# Patient Record
Sex: MALE | Race: WHITE | NOT HISPANIC OR LATINO | Employment: FULL TIME | URBAN - METROPOLITAN AREA
[De-identification: names, ages, dates, MRNs, and addresses within clinical notes are randomized per-mention and may not be internally consistent; named-entity substitution may affect disease eponyms.]

---

## 2017-02-04 ENCOUNTER — TRANSCRIBE ORDERS (OUTPATIENT)
Dept: ADMINISTRATIVE | Facility: HOSPITAL | Age: 57
End: 2017-02-04

## 2017-02-04 ENCOUNTER — HOSPITAL ENCOUNTER (OUTPATIENT)
Dept: RADIOLOGY | Facility: HOSPITAL | Age: 57
Discharge: HOME | End: 2017-02-04
Payer: COMMERCIAL

## 2017-02-04 DIAGNOSIS — R52 PAIN: ICD-10-CM

## 2017-02-04 DIAGNOSIS — R52 PAIN: Primary | ICD-10-CM

## 2017-02-04 PROCEDURE — 73502 X-RAY EXAM HIP UNI 2-3 VIEWS: CPT

## 2017-02-04 PROCEDURE — 72110 X-RAY EXAM L-2 SPINE 4/>VWS: CPT

## 2017-02-11 ENCOUNTER — TRANSCRIBE ORDERS (OUTPATIENT)
Dept: ADMINISTRATIVE | Facility: HOSPITAL | Age: 57
End: 2017-02-11

## 2017-02-11 DIAGNOSIS — M54.16 LUMBAR RADICULOPATHY: Primary | ICD-10-CM

## 2017-02-12 ENCOUNTER — HOSPITAL ENCOUNTER (OUTPATIENT)
Dept: MRI IMAGING | Facility: HOSPITAL | Age: 57
Discharge: HOME | End: 2017-02-12
Payer: COMMERCIAL

## 2017-02-12 DIAGNOSIS — M54.16 LUMBAR RADICULOPATHY: ICD-10-CM

## 2017-02-12 PROCEDURE — 72148 MRI LUMBAR SPINE W/O DYE: CPT

## 2018-06-30 ENCOUNTER — TRANSCRIBE ORDERS (OUTPATIENT)
Dept: ADMINISTRATIVE | Facility: HOSPITAL | Age: 58
End: 2018-06-30

## 2018-06-30 ENCOUNTER — HOSPITAL ENCOUNTER (OUTPATIENT)
Dept: RADIOLOGY | Facility: HOSPITAL | Age: 58
Discharge: HOME | End: 2018-06-30
Payer: COMMERCIAL

## 2018-06-30 DIAGNOSIS — M47.817 SPONDYLOSIS OF LUMBOSACRAL REGION WITHOUT MYELOPATHY OR RADICULOPATHY: ICD-10-CM

## 2018-06-30 DIAGNOSIS — M25.519 SHOULDER PAIN, UNSPECIFIED CHRONICITY, UNSPECIFIED LATERALITY: Primary | ICD-10-CM

## 2018-06-30 DIAGNOSIS — M54.17 RADICULITIS, LUMBOSACRAL: ICD-10-CM

## 2018-06-30 DIAGNOSIS — M25.519 SHOULDER PAIN, UNSPECIFIED CHRONICITY, UNSPECIFIED LATERALITY: ICD-10-CM

## 2018-06-30 DIAGNOSIS — M16.12 PRIMARY OSTEOARTHRITIS OF LEFT HIP: ICD-10-CM

## 2018-06-30 PROCEDURE — 73502 X-RAY EXAM HIP UNI 2-3 VIEWS: CPT

## 2018-06-30 PROCEDURE — 73030 X-RAY EXAM OF SHOULDER: CPT

## 2018-07-16 ENCOUNTER — EVALUATION (OUTPATIENT)
Dept: PHYSICAL THERAPY | Facility: CLINIC | Age: 58
End: 2018-07-16
Payer: COMMERCIAL

## 2018-07-16 DIAGNOSIS — M54.16 LUMBAR RADICULOPATHY: Primary | ICD-10-CM

## 2018-07-16 PROCEDURE — 97112 NEUROMUSCULAR REEDUCATION: CPT | Performed by: PHYSICAL MEDICINE & REHABILITATION

## 2018-07-16 PROCEDURE — G8991 OTHER PT/OT GOAL STATUS: HCPCS | Performed by: PHYSICAL MEDICINE & REHABILITATION

## 2018-07-16 PROCEDURE — 97162 PT EVAL MOD COMPLEX 30 MIN: CPT | Performed by: PHYSICAL MEDICINE & REHABILITATION

## 2018-07-16 PROCEDURE — G8990 OTHER PT/OT CURRENT STATUS: HCPCS | Performed by: PHYSICAL MEDICINE & REHABILITATION

## 2018-07-16 RX ORDER — FUROSEMIDE 40 MG/1
40 TABLET ORAL 2 TIMES DAILY
COMMUNITY

## 2018-07-16 NOTE — LETTER
2018    Hermilo Drummond MD  [address] [place] [address]    Patient: Lianet Davila   YOB: 1960   Date of Visit: 2018     Encounter Diagnosis     ICD-10-CM    1  Lumbar radiculopathy M54 16        Dear Dr Dawkins Sayres:    Please review the attached Plan of Care from [facility] recent visit  Please verify that you agree therapy should continue by signing the attached document and sending it back to our office  If you have any questions or concerns, please don't hesitate to call  Sincerely,    Bailey Person      Referring Provider:      I certify that I have read the below Plan of Care and certify the need for these services furnished under this plan of treatment while under my care  Hermilo Drummond MD  [address] [place] [address]  VIA Facsimile: [phone]          PT Evaluation     Today's date: 2018  Patient name: Lianet Davila  : 1960  MRN: [id number]  Referring provider: Lawyer Luis MD  Dx:   Encounter Diagnosis     ICD-10-CM    1  Lumbar radiculopathy M54 16                   Assessment  Impairments: abnormal coordination, abnormal gait, abnormal muscle firing, abnormal muscle tone, abnormal or restricted ROM, abnormal movement, activity intolerance, impaired physical strength, lacks appropriate home exercise program, pain with function, weight-bearing intolerance, poor posture  and poor body mechanics    Assessment details: Patient is a 60 y/o male who reports to outpatient physical therapy with a referral for lower back pain  No further referral appears necessary at this time based upon examination results  Probable movement impairment diagnosis of posterior derangement resulting in pathoanatomical symptoms of pain, decreased ROM and decreased strength which limiting his ability to work, walk, drive, and sit for prolonged periods of time   Skilled physical therapy is warranted for the application of the interventions found below in the planned intervention section  Etiologic factors include poor posture  Prognosis is good given HEP compliance and attendance to physical therapy 2x a week for 8 weeks  Positive prognostic indicators include positive attitude and low fear avoidance  Negative prognostic indicators include decreased muscle coordination and high BMI  Please contact me if you have any questions or recommendations  Thank you for the referral and the opportunity to share in [address] [place] [address]  Patient verbalized understanding of POC, HEP, and return demonstrated HEP  Goals  ST  Wilkins Shone will report his symptoms have decreased by 40% within 4 weeks  2  Wilkins Shone will report standing up is 50% easier than at IE within 4 weeks  LT  Wilkins Shone will report walking is 60% easier than at IE within 8 weeks to increase his working tolerance  2  Wilkins Shone will report he has had a 70% reduction in symptoms within 8 weeks to increase his working tolerance  3  Wilkins Shone will report he is able to mow his lawn with minimal symptom increase within 8 weeks        Plan  Patient would benefit from: skilled physical therapy  Planned modality interventions: cryotherapy and thermotherapy: hydrocollator packs  Planned therapy interventions: abdominal trunk stabilization, joint mobilization, manual therapy, massage, motor coordination training, muscle pump exercises, neuromuscular re-education, body mechanics training, breathing training, patient education, postural training, sensory integrative techniques, coordination, strengthening, stretching, therapeutic activities, therapeutic exercise, flexibility, functional ROM exercises, therapeutic training, gait training, home exercise program, graded activity and graded exercise  Frequency: 2x week  Duration in weeks: 8  Treatment plan discussed with: patient        Subjective Evaluation    History of Present Illness  Mechanism of injury: Wilkins Shone presents to outpatient physical therapy with a referral for low back pain  Jasmine Abreu reports pain started two years ago and has gotten worse since  Work/School: 60-70 hours a week as a , has to walk a lot,   Home Life: cooking, cleaning,   Hobbies/exercise: working  Pain location: left lateral lumbar region, left leg superior gastrocnemius,   HPI: He reports pain for the past two years, had an MRI two years ago, L5 disc degeneration and arthritis in left hip, he was told he has spinal stenosis and pain increases via walking, he reports "I would like another MRI but was told he needed therapy first", patient reports he is waking up from cramping which causes him to scream due to pain, he reports he is noticing increased frequency and has a hard time preventing urination, he also reports he has experienced constipation since the back started  Aggravating factors: standing up, he reports his feet get numb with walking,   Relieving factors: moving around, sitting  PMH: HTN, left knee surgery ACL/meniscus 1990s as per patient report,   Patient Goals: be able to walk more easily, be able to sleep at night,   Patient reports he has tried acupuncture which patient reports it helped for a short period of time    Chiropractic care: helped for 15-20 minutes but then the pain comes back  Pain  Current pain ratin  At best pain ratin  At worst pain rating: 10          Objective     Static Posture     Comments  ROM:  Lumbar:  Flexion: 25% limited  Extension: 50% limited    Repeated motions:  Standing extension: centralized symptoms  Prone progression: symptoms abolished while laying prone, once weight bearing again    MMTs:  LE:  Hip flex: R: 4+/5 L: 3+/5   Knee ext: R: 5/5 L: 4/5   Knee flex: R: 4/5 L: 3+/5  DF: R: 4+/5 L: 3+/5  Hip abd: R: 3/5 L: 3/5     Dermatomes:   L2-L3: L: slightly diminished  L4: L: diminshed  L5: L: diminished  S1: L: diminished  S2: L: slightly diminished    Clinical Examination Tests:  Estela's Sign: +      Flowsheet Rows      Most Recent Value   PT/OT G-Codes   Current Score  45   Projected Score  56   FOTO information reviewed  Yes   Assessment Type  Evaluation   G code set  Other PT/OT Primary   Other PT Primary Current Status ()  CK   Other PT Primary Goal Status ()  CH          Precautions: HTN    Daily Treatment Diary     Manual  7/16/2018                                                                                 Exercise Diary              *prone progression             *TrA contraction             TrA leg fall out             Repeated extension in prone                                                                                                                                                                                                                HEP teaching and return demonstration 10min                Modalities

## 2018-07-16 NOTE — PROGRESS NOTES
PT Evaluation     Today's date: 2018  Patient name: Poly Arevalo  : 1960  MRN: [id number]  Referring provider: Mainor Vallejo MD  Dx:   Encounter Diagnosis     ICD-10-CM    1  Lumbar radiculopathy M54 16                   Assessment  Impairments: abnormal coordination, abnormal gait, abnormal muscle firing, abnormal muscle tone, abnormal or restricted ROM, abnormal movement, activity intolerance, impaired physical strength, lacks appropriate home exercise program, pain with function, weight-bearing intolerance, poor posture  and poor body mechanics    Assessment details: Patient is a 58 y/o male who reports to outpatient physical therapy with a referral for lower back pain  No further referral appears necessary at this time based upon examination results  Probable movement impairment diagnosis of posterior derangement resulting in pathoanatomical symptoms of pain, decreased ROM and decreased strength which limiting his ability to work, walk, drive, and sit for prolonged periods of time  Skilled physical therapy is warranted for the application of the interventions found below in the planned intervention section  Etiologic factors include poor posture  Prognosis is good given HEP compliance and attendance to physical therapy 2x a week for 8 weeks  Positive prognostic indicators include positive attitude and low fear avoidance  Negative prognostic indicators include decreased muscle coordination and high BMI  Please contact me if you have any questions or recommendations  Thank you for the referral and the opportunity to share in [address] [place] [address]  Patient verbalized understanding of POC, HEP, and return demonstrated HEP  Goals  ST  Wilson Nyhan will report his symptoms have decreased by 40% within 4 weeks  2  Wilson Nyhan will report standing up is 50% easier than at IE within 4 weeks  LT  Wilson Nyhan will report walking is 60% easier than at IE within 8 weeks to increase his working tolerance    2  Rakesh Quinones will report he has had a 70% reduction in symptoms within 8 weeks to increase his working tolerance  3  Rakesh Quinones will report he is able to mow his lawn with minimal symptom increase within 8 weeks  Plan  Patient would benefit from: skilled physical therapy  Planned modality interventions: cryotherapy and thermotherapy: hydrocollator packs  Planned therapy interventions: abdominal trunk stabilization, joint mobilization, manual therapy, massage, motor coordination training, muscle pump exercises, neuromuscular re-education, body mechanics training, breathing training, patient education, postural training, sensory integrative techniques, coordination, strengthening, stretching, therapeutic activities, therapeutic exercise, flexibility, functional ROM exercises, therapeutic training, gait training, home exercise program, graded activity and graded exercise  Frequency: 2x week  Duration in weeks: 8  Treatment plan discussed with: patient        Subjective Evaluation    History of Present Illness  Mechanism of injury: Rakesh Quinones presents to outpatient physical therapy with a referral for low back pain  Rakesh Quinones reports pain started two years ago and has gotten worse since       Work/School: 60-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 week as a , has to walk a lot,   Home Life: cooking, cleaning,   Hobbies/exercise: working  Pain location: left lateral lumbar region, left leg superior gastrocnemius,   HPI: He reports pain for the past two years, had an MRI two years ago, L5 disc degeneration and arthritis in left hip, he was told he has spinal stenosis and pain increases via walking, he reports "I would like another MRI but was told he needed therapy first", patient reports he is waking up from cramping which causes him to scream due to pain, he reports he is noticing increased frequency and has a hard time preventing urination, he also reports he has experienced constipation since the back started  Aggravating factors: standing up, he reports his feet get numb with walking,   Relieving factors: moving around, sitting  PMH: HTN, left knee surgery ACL/meniscus  as per patient report,   Patient Goals: be able to walk more easily, be able to sleep at night,   Patient reports he has tried acupuncture which patient reports it helped for a short period of time    Chiropractic care: helped for 15-20 minutes but then the pain comes back  Pain  Current pain ratin  At best pain ratin  At worst pain rating: 10          Objective     Static Posture     Comments  ROM:  Lumbar:  Flexion: 25% limited  Extension: 50% limited    Repeated motions:  Standing extension: centralized symptoms  Prone progression: symptoms abolished while laying prone, once weight bearing again    MMTs:  LE:  Hip flex: R: 4+/5 L: 3+/5   Knee ext: R: 5/5 L: 4/5   Knee flex: R: 4/5 L: 3+/5  DF: R: 4+/5 L: 3+/5  Hip abd: R: 3/5 L: 3/5     Dermatomes:   L2-L3: L: slightly diminished  L4: L: diminshed  L5: L: diminished  S1: L: diminished  S2: L: slightly diminished    Clinical Examination Tests:  Estela's Sign: +      Flowsheet Rows      Most Recent Value   PT/OT G-Codes   Current Score  45   Projected Score  56   FOTO information reviewed  Yes   Assessment Type  Evaluation   G code set  Other PT/OT Primary   Other PT Primary Current Status ()  CK   Other PT Primary Goal Status ()  CH          Precautions: HTN    Daily Treatment Diary     Manual  2018                                                                                 Exercise Diary              *prone progression             *TrA contraction             TrA leg fall out             Repeated extension in prone                                                                                                                                                                                                                HEP teaching and return demonstration 10min                Modalities

## 2018-07-17 ENCOUNTER — TRANSCRIBE ORDERS (OUTPATIENT)
Dept: PHYSICAL THERAPY | Facility: CLINIC | Age: 58
End: 2018-07-17

## 2018-07-17 DIAGNOSIS — M54.16 LUMBAR RADICULOPATHY: Primary | ICD-10-CM

## 2018-07-23 ENCOUNTER — TELEPHONE (OUTPATIENT)
Dept: PHYSICAL THERAPY | Facility: CLINIC | Age: 58
End: 2018-07-23

## 2018-07-23 ENCOUNTER — OFFICE VISIT (OUTPATIENT)
Dept: PHYSICAL THERAPY | Facility: CLINIC | Age: 58
End: 2018-07-23
Payer: COMMERCIAL

## 2018-07-23 DIAGNOSIS — M54.16 LUMBAR RADICULOPATHY: Primary | ICD-10-CM

## 2018-07-23 PROCEDURE — 97112 NEUROMUSCULAR REEDUCATION: CPT | Performed by: PHYSICAL MEDICINE & REHABILITATION

## 2018-07-23 PROCEDURE — 97110 THERAPEUTIC EXERCISES: CPT | Performed by: PHYSICAL MEDICINE & REHABILITATION

## 2018-07-23 PROCEDURE — 97140 MANUAL THERAPY 1/> REGIONS: CPT | Performed by: PHYSICAL MEDICINE & REHABILITATION

## 2018-07-23 NOTE — PROGRESS NOTES
Daily Note     Today's date: 2018  Patient name: Farida Trejo  : 1960  MRN: [id number]  Referring provider: Gila Goddard DO  Dx:   Encounter Diagnosis     ICD-10-CM    1  Lumbar radiculopathy M54 16                   Subjective: Rica Chaves reported "I have 10/10 pain, it goes up after driving for so long "    Objective: See treatment diary below  Precautions: HTN    Daily Treatment Diary     Manual  2018           Therapist overpressure ext  10min           STM  5min                                                      Exercise Diary              *prone progression  15min           *TrA contraction  3x15           TrA leg fall out             Repeated extension in prone  3x15           Repeated extension in prone  3x10                                                                                                                                                                                                 HEP teaching and return demonstration 10min                Modalities              Hot pack  8min                                         Assessment: Tolerated treatment well  Patient would benefit from continued PT  Rica Chaves was able to initiate his therapy program which shows progress towards his goals  He reported relief while lying in prone and during his prone progression  However, after ambulating for greater than 50ft he reported his pain went back to baseline  He needed verbal cueing during his TrA contractions  Following prone lying with the hot pack he reported a decrease in pain  Skin integrity was checked pre and post hot pack with no abnormal findings  Plan: Continue per plan of care

## 2018-07-26 ENCOUNTER — OFFICE VISIT (OUTPATIENT)
Dept: PHYSICAL THERAPY | Facility: CLINIC | Age: 58
End: 2018-07-26
Payer: COMMERCIAL

## 2018-07-26 DIAGNOSIS — M54.16 LUMBAR RADICULOPATHY: Primary | ICD-10-CM

## 2018-07-26 PROCEDURE — 97014 ELECTRIC STIMULATION THERAPY: CPT | Performed by: PHYSICAL MEDICINE & REHABILITATION

## 2018-07-26 PROCEDURE — 97140 MANUAL THERAPY 1/> REGIONS: CPT | Performed by: PHYSICAL MEDICINE & REHABILITATION

## 2018-07-26 PROCEDURE — 97110 THERAPEUTIC EXERCISES: CPT | Performed by: PHYSICAL MEDICINE & REHABILITATION

## 2018-07-26 PROCEDURE — 97112 NEUROMUSCULAR REEDUCATION: CPT | Performed by: PHYSICAL MEDICINE & REHABILITATION

## 2018-07-26 NOTE — PROGRESS NOTES
Daily Note     Today's date: 2018  Patient name: Rg Castelan  : 1960  MRN: [id number]  Referring provider: Bolivar Lorenzana DO  Dx:   Encounter Diagnosis     ICD-10-CM    1  Lumbar radiculopathy M54 16        Start Time: 437  Stop Time: 953  Total time in clinic (min): 55 minutes    Subjective: Oscar Danny reported "during the day was not as bad as after sitting in the car for a long time driving here "    Objective: See treatment diary below  Precautions: HTN    Daily Treatment Diary     Manual  2018          Therapist overpressure ext  10min           STM  5min 15min                                                     Exercise Diary              *prone progression  15min 20min          *TrA contraction  3x15 3x15 in prone          TrA leg fall out   2x10          Repeated extension in prone  3x15           Repeated extension in prone  3x10           Paloff Press   4lbs 10x2                                                                                                                                                                                   HEP teaching and return demonstration 10min                Modalities              Hot pack  8min 10min          E-Stim   10min                         Assessment: Tolerated treatment well  Patient would benefit from continued PT  Oscar Delgado was able to add Paloff press and leg fall outs to his therapy program which shows progress towards his goals  He reported relief from the prone progression as well as hot pack and E-Stim  Skin integrity was checked pre and post modalities with no abnormal findings  Plan: Continue per plan of care

## 2018-07-30 ENCOUNTER — OFFICE VISIT (OUTPATIENT)
Dept: PHYSICAL THERAPY | Facility: CLINIC | Age: 58
End: 2018-07-30
Payer: COMMERCIAL

## 2018-07-30 DIAGNOSIS — M54.16 LUMBAR RADICULOPATHY: Primary | ICD-10-CM

## 2018-07-30 PROCEDURE — 97112 NEUROMUSCULAR REEDUCATION: CPT | Performed by: PHYSICAL MEDICINE & REHABILITATION

## 2018-07-30 PROCEDURE — 97140 MANUAL THERAPY 1/> REGIONS: CPT | Performed by: PHYSICAL MEDICINE & REHABILITATION

## 2018-07-30 PROCEDURE — 97110 THERAPEUTIC EXERCISES: CPT | Performed by: PHYSICAL MEDICINE & REHABILITATION

## 2018-07-30 PROCEDURE — 97014 ELECTRIC STIMULATION THERAPY: CPT | Performed by: PHYSICAL MEDICINE & REHABILITATION

## 2018-07-30 NOTE — PROGRESS NOTES
Daily Note     Today's date: 2018  Patient name: Bao Oshea  : 1960  MRN: [id number]  Referring provider: Laura Johnston DO  Dx:   Encounter Diagnosis     ICD-10-CM    1  Lumbar radiculopathy M54 16                   Subjective: Jamarcus Hodge reported "Over the weekend things maybe were not as painful, but I am frustrated that it just won't go away "      Objective: See treatment diary below  Precautions: HTN    Daily Treatment Diary     Manual  2018         Therapist overpressure ext  10min           STM  5min 15min 15min                                                    Exercise Diary              *prone progression  15min 20min 20min         *TrA contraction  3x15 3x15 in prone 3x15 in prone         TrA leg fall out   2x10 2x10         Repeated extension in prone  3x15           Repeated extension in prone  3x10           Paloff Press   4lbs 10x2          Wall lateral bend - right shoulder away    3x10                                                                                                                                                                     HEP teaching and return demonstration 10min                Modalities              Hot pack  8min 10min 10min         E-Stim   10min 10min                          Assessment: Tolerated treatment well  Patient would benefit from continued PT  Jamarcus Hodge was able to centralize his left foot numbness and pain with wall lateral movements which shows progress towards his goals  He needed verbal and visual cueing for proper form with the lateral wall glides  At the end of the session he reported a decrease in pain and he did not peripheralized upon weight bearing  Plan: Continue per plan of care

## 2018-08-02 ENCOUNTER — OFFICE VISIT (OUTPATIENT)
Dept: PHYSICAL THERAPY | Facility: CLINIC | Age: 58
End: 2018-08-02
Payer: COMMERCIAL

## 2018-08-02 DIAGNOSIS — M54.16 LUMBAR RADICULOPATHY: Primary | ICD-10-CM

## 2018-08-02 PROCEDURE — 97110 THERAPEUTIC EXERCISES: CPT | Performed by: PHYSICAL MEDICINE & REHABILITATION

## 2018-08-02 PROCEDURE — 97112 NEUROMUSCULAR REEDUCATION: CPT | Performed by: PHYSICAL MEDICINE & REHABILITATION

## 2018-08-02 PROCEDURE — 97140 MANUAL THERAPY 1/> REGIONS: CPT | Performed by: PHYSICAL MEDICINE & REHABILITATION

## 2018-08-02 NOTE — PROGRESS NOTES
Daily Note     Today's date: 2018  Patient name: River Pérez  : 1960  MRN: [id number]  Referring provider: Hussain Lara DO  Dx:   Encounter Diagnosis     ICD-10-CM    1  Lumbar radiculopathy M54 16                   Subjective: Kelton Bedolla reported "I have numbness in the front of my foot which is really annoying, my pain still kicks in around 2PM "    Objective: See treatment diary below  Precautions: HTN    Daily Treatment Diary     Manual  2018        Therapist overpressure ext  10min           STM  5min 15min 15min         Manual traction     10min                                      Exercise Diary              *prone progression  15min 20min 20min 20min        *TrA contraction  3x15 3x15 in prone 3x15 in prone 3x15 in supine        TrA leg fall out   2x10 2x10 2x10        Repeated extension in prone  3x15           Repeated extension in prone  3x10           Paloff Press   4lbs 10x2          Wall lateral bend - right shoulder away    3x10 3x12                                                                                                                                                                    HEP teaching and return demonstration 10min                Modalities              Hot pack  8min 10min 10min 15min        E-Stim   10min 10min 15min                         Assessment: Tolerated treatment well  Patient would benefit from continued PT  Kelton Bedolla was able to centralize his symptoms to his left PSIS which shows progress towards his goals  He also denied peripheralization in weight bearing while standing  Plan: Continue per plan of care

## 2018-08-03 ENCOUNTER — TRANSCRIBE ORDERS (OUTPATIENT)
Dept: ADMINISTRATIVE | Facility: HOSPITAL | Age: 58
End: 2018-08-03

## 2018-08-03 DIAGNOSIS — R06.81 APNEA: Primary | ICD-10-CM

## 2018-08-06 ENCOUNTER — TELEPHONE (OUTPATIENT)
Dept: PHYSICAL THERAPY | Facility: CLINIC | Age: 58
End: 2018-08-06

## 2018-08-06 ENCOUNTER — APPOINTMENT (OUTPATIENT)
Dept: PHYSICAL THERAPY | Facility: CLINIC | Age: 58
End: 2018-08-06
Payer: COMMERCIAL

## 2018-08-09 ENCOUNTER — OFFICE VISIT (OUTPATIENT)
Dept: SLEEP CENTER | Facility: CLINIC | Age: 58
End: 2018-08-09
Payer: COMMERCIAL

## 2018-08-09 ENCOUNTER — OFFICE VISIT (OUTPATIENT)
Dept: PHYSICAL THERAPY | Facility: CLINIC | Age: 58
End: 2018-08-09
Payer: COMMERCIAL

## 2018-08-09 VITALS
HEIGHT: 74 IN | BODY MASS INDEX: 40.43 KG/M2 | DIASTOLIC BLOOD PRESSURE: 78 MMHG | SYSTOLIC BLOOD PRESSURE: 140 MMHG | WEIGHT: 315 LBS | HEART RATE: 66 BPM

## 2018-08-09 DIAGNOSIS — R68.2 DRY MOUTH: ICD-10-CM

## 2018-08-09 DIAGNOSIS — I10 ESSENTIAL HYPERTENSION: ICD-10-CM

## 2018-08-09 DIAGNOSIS — G47.33 OBSTRUCTIVE SLEEP APNEA: Primary | ICD-10-CM

## 2018-08-09 DIAGNOSIS — Z91.14 POOR COMPLIANCE WITH CPAP TREATMENT: ICD-10-CM

## 2018-08-09 DIAGNOSIS — E66.01 MORBID OBESITY WITH BMI OF 40.0-44.9, ADULT (HCC): ICD-10-CM

## 2018-08-09 DIAGNOSIS — M54.16 LUMBAR RADICULOPATHY: Primary | ICD-10-CM

## 2018-08-09 PROBLEM — Z91.199 POOR COMPLIANCE WITH CPAP TREATMENT: Status: ACTIVE | Noted: 2018-08-09

## 2018-08-09 PROCEDURE — 97112 NEUROMUSCULAR REEDUCATION: CPT | Performed by: PHYSICAL MEDICINE & REHABILITATION

## 2018-08-09 PROCEDURE — 97010 HOT OR COLD PACKS THERAPY: CPT | Performed by: PHYSICAL MEDICINE & REHABILITATION

## 2018-08-09 PROCEDURE — 97140 MANUAL THERAPY 1/> REGIONS: CPT | Performed by: PHYSICAL MEDICINE & REHABILITATION

## 2018-08-09 PROCEDURE — 97014 ELECTRIC STIMULATION THERAPY: CPT | Performed by: PHYSICAL MEDICINE & REHABILITATION

## 2018-08-09 PROCEDURE — 97110 THERAPEUTIC EXERCISES: CPT | Performed by: PHYSICAL MEDICINE & REHABILITATION

## 2018-08-09 PROCEDURE — 99214 OFFICE O/P EST MOD 30 MIN: CPT | Performed by: INTERNAL MEDICINE

## 2018-08-09 NOTE — PROGRESS NOTES
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  PFSH, Problem List, Medications & Allergies were reviewed in EMR  Interval changes: [none reported ]   He  has a past medical history of Edema and Hypertension  He has a current medication list which includes the following prescription(s): furosemide, gabapentin, lisinopril, naproxen sodium, albuterol, benzonatate, hydrochlorothiazide, and levofloxacin  ROS: Reviewed (see attached)  [Significant for approximately 20 lb weight gain since his last visit  He reported no nasal or respiratory symptoms  He is no longer using Cymbalta  He has nocturia around 2 times a night  He uses gabapentin with good effect for back pain  HPI:  With respect to compliance, data download shows: discontinued use of PAP  Finas Promise reports  · some difficulty tolerating PAP;   · significant adverse effects: dry mouth limits use  · Benefitting from use:  Not really    Sleep Routine: He reports getting 6-7 hours sleep; he has no difficulty initiating or maintaining sleep   He may have difficulties at time because of racing thoughts  He awakens with the aid of an alarm feeling refreshed  He denied excessive drowsiness[ ]  He rated himself at Total score: 6 /24 on the Andreas sleepiness scale  Habits:[ reports that he has never smoked  He has never used smokeless tobacco ], [ reports that he does not drink alcohol ], [ reports that he does not use drugs  ], Caffeine use: limited[ ], Exercise routine: none but he is very active in his job        EXAM: /78   Pulse 66   Ht 6' 2" (1 88 m)   Wt (!) 158 kg (348 lb)   BMI 44 68 kg/m²      Patient is alert, orientated, cooperative [and in no distress]  Mental state [appears normal]  Craniofacial anatomy normal  There are [no] facial pressure marks or rashes  There are no abnormal neck masses  Nasal airway is [patent ]  Mucous membranes appeared normal  The oral airway [is crowded ] [Apart from truncal obesity,] the rest of exam (Heart, Lungs, Abdomen, CNS and Musculoskeletal systems) was unremarkable   IMPRESSION:   1  Obstructive sleep apnea  Sleep F/U CPAP - established patient    Sleep F/U  - established patient    Cpap DME   2  Poor compliance with CPAP treatment     3  Dry mouth     4  Essential hypertension     5  Morbid obesity with BMI of 40 0-44 9, adult ([facility] [address] )       PLAN:  1  I reviewed results of the Sleep studies with the patient  2  I discussed treatment options with risks and benefits  3  Treatment with  PAP is medically necessary and Janae Hockey is agreable to continue use  4  Instruction on care of equipment, strategies to improve comfort and importance of compliance with PAP therapy were discussed  I advised adjusting heated humidity settings, Biotene mouth wash and or Xyimelt  5  Pressure setting:  Change to auto titrating Pap 10-14 cm H2O     6  Rx provided to replace supplies and Care coordinated with DME provider  7  Strategies for weight reduction discussed  8  With your consent, follow-up is advised in [1 Jane Heading [or sooner if needed] [to monitor progress, compliance and to adjust therapy]  Thank you for allowing me to participate in the care of this patient      Sincerely,    Authenticated electronically by Rick Appiah MD on 05/19/11   Board Certified Specialist

## 2018-08-09 NOTE — PROGRESS NOTES
Review of Systems      Genitourinary need to urinate more than twice a night   Cardiology ankle/leg swelling   Gastrointestinal none   Neurology none   Constitutional fatigue   Integumentary none   Psychiatry none   Musculoskeletal joint pain and muscle aches   Pulmonary none   ENT none   Endocrine frequent urination   Hematological none

## 2018-08-13 ENCOUNTER — OFFICE VISIT (OUTPATIENT)
Dept: PHYSICAL THERAPY | Facility: CLINIC | Age: 58
End: 2018-08-13
Payer: COMMERCIAL

## 2018-08-13 DIAGNOSIS — M54.16 LUMBAR RADICULOPATHY: Primary | ICD-10-CM

## 2018-08-13 PROCEDURE — G8991 OTHER PT/OT GOAL STATUS: HCPCS | Performed by: PHYSICAL MEDICINE & REHABILITATION

## 2018-08-13 PROCEDURE — 97112 NEUROMUSCULAR REEDUCATION: CPT | Performed by: PHYSICAL MEDICINE & REHABILITATION

## 2018-08-13 PROCEDURE — G8990 OTHER PT/OT CURRENT STATUS: HCPCS | Performed by: PHYSICAL MEDICINE & REHABILITATION

## 2018-08-13 PROCEDURE — 97110 THERAPEUTIC EXERCISES: CPT | Performed by: PHYSICAL MEDICINE & REHABILITATION

## 2018-08-13 PROCEDURE — 97140 MANUAL THERAPY 1/> REGIONS: CPT | Performed by: PHYSICAL MEDICINE & REHABILITATION

## 2018-08-13 NOTE — PROGRESS NOTES
PT Re-Evaluation     Today's date: 2018  Patient name: Stephanie Hawley  : 1960  MRN: [id number]  Referring provider: Lamine Marquez MD  Dx:   Encounter Diagnosis     ICD-10-CM    1  Lumbar radiculopathy M54 16        Start Time: 3456  Stop Time: 5567  Total time in clinic (min): 60 minutes    Assessment  Impairments: abnormal coordination, abnormal gait, abnormal muscle firing, abnormal muscle tone, abnormal or restricted ROM, abnormal movement, activity intolerance, impaired physical strength, lacks appropriate home exercise program, pain with function, weight-bearing intolerance, poor posture  and poor body mechanics    Assessment details: Mesha Chang has made some progress towards his goals  Mesha Chang has been able to decrease his symptoms while in the clinic and has recently been able to prolong his time at work with less symptoms  However, he reports his symptoms consistently return to his baseline following his 1 hour commute home  He demonstrates trigger points on his left lumbar spine superior to the PSIS that reproduces his foot pain  I suggesting further consultation with referring pain management Dr Brian Tubbs for potential interventions as well as continued physical therapy to continue to build upon the progress he has made  Etiologic factors include poor posture  Prognosis is fair given HEP compliance and attendance to physical therapy 2x a week for 4 weeks  Please contact me if you have any questions or recommendations  Thank you for the referral and the opportunity to share in [address] [place] [address]  Patient verbalized understanding of POC, HEP, and return demonstrated HEP  Understanding of Dx/Px/POC: good   Prognosis: fair    Goals  ST  Joanna Taylor will report his symptoms have decreased by 40% within 4 weeks  - partially met  2  Joanna Taylor will report standing up is 50% easier than at IE within 4 weeks  - partially met    LT   Joanna Taylor will report walking is 60% easier than at IE within 8 weeks to increase his working tolerance  - not met  2  Deepak Enrique will report he has had a 70% reduction in symptoms within 8 weeks to increase his working tolerance  - not met  3  Deepak Enrique will report he is able to mow his lawn with minimal symptom increase within 8 weeks  - not met      Plan  Patient would benefit from: skilled physical therapy  Planned modality interventions: cryotherapy and thermotherapy: hydrocollator packs  Planned therapy interventions: abdominal trunk stabilization, joint mobilization, manual therapy, massage, motor coordination training, muscle pump exercises, neuromuscular re-education, body mechanics training, breathing training, patient education, postural training, sensory integrative techniques, coordination, strengthening, stretching, therapeutic activities, therapeutic exercise, flexibility, functional ROM exercises, therapeutic training, gait training, home exercise program, graded activity and graded exercise  Frequency: 2x week  Duration in weeks: 4  Treatment plan discussed with: patient        Subjective Evaluation    History of Present Illness  Mechanism of injury: Deepak Enrique reported "I'll feel better at times but still every time I drive home in my truck things go right back to where they were "  Pain  Current pain ratin  At best pain ratin  At worst pain ratin          Objective     Static Posture     Comments  ROM:  Lumbar:  Flexion: 25% limited  Extension: 25% limited    Repeated motions:  Symptoms decreased with left lateral shift into extension      MMTs:  LE:  Hip flex: R: 4+/5 L: 3+/5   Knee ext: R: 5/5 L: 4/5   Knee flex: R: 4/5 L: 4-/5  DF: R: 4+/5 L: 4-/5  Hip abd: R: 3/5 L: 3/5     Dermatomes:   L2-L3: L: slightly diminished  L4: L: diminshed  L5: L: diminished  S1: L: diminished  S2: L: slightly diminished    Clinical Examination Tests:  Estela's Sign: +      Flowsheet Rows      Most Recent Value   PT/OT G-Codes   Current Score  49   Projected Score  56   FOTO information reviewed  Yes Assessment Type  Re-evaluation   G code set  Other PT/OT Primary   Other PT Primary Current Status ()  CK   Other PT Primary Goal Status ()  CH          Precautions: HTN    Daily Treatment Diary     Manual  7/16/2018 7/23/2018 7/26/2018 7/30/2018 8/2/2018 8/9/2018 8/13/2018      Therapist overpressure ext  10min           STM  5min 15min 15min         Manual traction     10min 10min 10min      Lateral hip distraction       5min      STM       8min          Exercise Diary              *prone progression  15min 20min 20min 20min        *TrA contraction  3x15 3x15 in prone 3x15 in prone 3x15 in supine 3x15 in supine       TrA leg fall out   2x10 2x10 2x10 2x10       Repeated extension in prone  3x15           Repeated extension in prone  3x10           Paloff Press   4lbs 10x2          Wall lateral bend - right shoulder away    3x10 3x12 4x15 3x15                                                                                                                                                                  HEP teaching and return demonstration 10min                Modalities              Hot pack  8min 10min 10min 15min 15min       E-Stim   10min 10min 15min 15min

## 2018-08-13 NOTE — LETTER
2018    Ajay Duron MD  [address] [place] [address] 1  [facility]    Patient: Apurva Pedraza   YOB: 1960   Date of Visit: 2018     Encounter Diagnosis     ICD-10-CM    1  Lumbar radiculopathy M54 16        Dear Dr Nicole Wei:    Please review the attached Plan of Care from [facility] recent visit  Please verify that you agree therapy should continue by signing the attached document and sending it back to our office  If you have any questions or concerns, please don't hesitate to call  Sincerely,    Nic Doran      Referring Provider:      I certify that I have read the below Plan of Care and certify the need for these services furnished under this plan of treatment while under my care  Ajay Duron MD  [address] [place] [address] Ave: [phone]          PT Re-Evaluation     Today's date: 2018  Patient name: Apurva Pedraza  : 1960  MRN: [id number]  Referring provider: Randa Sultana MD  Dx:   Encounter Diagnosis     ICD-10-CM    1  Lumbar radiculopathy M54 16        Start Time: 6154  Stop Time: 7744  Total time in clinic (min): 60 minutes    Assessment  Impairments: abnormal coordination, abnormal gait, abnormal muscle firing, abnormal muscle tone, abnormal or restricted ROM, abnormal movement, activity intolerance, impaired physical strength, lacks appropriate home exercise program, pain with function, weight-bearing intolerance, poor posture  and poor body mechanics    Assessment details: Deondre Willson has made some progress towards his goals  Deondre Willson has been able to decrease his symptoms while in the clinic and has recently been able to prolong his time at work with less symptoms  However, he reports his symptoms consistently return to his baseline following his 1 hour commute home  He demonstrates trigger points on his left lumbar spine superior to the PSIS that reproduces his foot pain   I suggesting further consultation with referring pain management Dr Ike Ziegler for potential interventions as well as continued physical therapy to continue to build upon the progress he has made  Etiologic factors include poor posture  Prognosis is fair given HEP compliance and attendance to physical therapy 2x a week for 4 weeks  Please contact me if you have any questions or recommendations  Thank you for the referral and the opportunity to share in [address] [place] [address]  Patient verbalized understanding of POC, HEP, and return demonstrated HEP  Understanding of Dx/Px/POC: good   Prognosis: fair    Goals  ST  Juan Carroll will report his symptoms have decreased by 40% within 4 weeks  - partially met  2  Juan Carroll will report standing up is 50% easier than at IE within 4 weeks  - partially met    LT  Juan Carroll will report walking is 60% easier than at IE within 8 weeks to increase his working tolerance  - not met  2  Juan Carroll will report he has had a 70% reduction in symptoms within 8 weeks to increase his working tolerance  - not met  3  Juan Carroll will report he is able to mow his lawn with minimal symptom increase within 8 weeks   - not met      Plan  Patient would benefit from: skilled physical therapy  Planned modality interventions: cryotherapy and thermotherapy: hydrocollator packs  Planned therapy interventions: abdominal trunk stabilization, joint mobilization, manual therapy, massage, motor coordination training, muscle pump exercises, neuromuscular re-education, body mechanics training, breathing training, patient education, postural training, sensory integrative techniques, coordination, strengthening, stretching, therapeutic activities, therapeutic exercise, flexibility, functional ROM exercises, therapeutic training, gait training, home exercise program, graded activity and graded exercise  Frequency: 2x week  Duration in weeks: 4  Treatment plan discussed with: patient        Subjective Evaluation    History of Present Illness  Mechanism of injury: Juan Carroll reported "I'll feel better at times but still every time I drive home in my truck things go right back to where they were "  Pain  Current pain ratin  At best pain ratin  At worst pain ratin          Objective     Static Posture     Comments  ROM:  Lumbar:  Flexion: 25% limited  Extension: 25% limited    Repeated motions:  Symptoms decreased with left lateral shift into extension      MMTs:  LE:  Hip flex: R: 4+/5 L: 3+/5   Knee ext: R: 5/5 L: 4/5   Knee flex: R: 4/5 L: 4-/5  DF: R: 4+/5 L: 4-/5  Hip abd: R: 3/5 L: 3/5     Dermatomes:   L2-L3: L: slightly diminished  L4: L: diminshed  L5: L: diminished  S1: L: diminished  S2: L: slightly diminished    Clinical Examination Tests:  Mustang's Sign: +      Flowsheet Rows      Most Recent Value   PT/OT G-Codes   Current Score  49   Projected Score  56   FOTO information reviewed  Yes   Assessment Type  Re-evaluation   G code set  Other PT/OT Primary   Other PT Primary Current Status ()  CK   Other PT Primary Goal Status ()  CH          Precautions: HTN    Daily Treatment Diary     Manual  2018      Therapist overpressure ext  10min           STM  5min 15min 15min         Manual traction     10min 10min 10min      Lateral hip distraction       5min      STM       8min          Exercise Diary              *prone progression  15min 20min 20min 20min        *TrA contraction  3x15 3x15 in prone 3x15 in prone 3x15 in supine 3x15 in supine       TrA leg fall out   2x10 2x10 2x10 2x10       Repeated extension in prone  3x15           Repeated extension in prone  3x10           Paloff Press   4lbs 10x2          Wall lateral bend - right shoulder away    3x10 3x12 4x15 3x15                                                                                                                                                                  HEP teaching and return demonstration 10min Modalities              Hot pack  8min 10min 10min 15min 15min       E-Stim   10min 10min 15min 15min

## 2018-08-14 ENCOUNTER — TRANSCRIBE ORDERS (OUTPATIENT)
Dept: PHYSICAL THERAPY | Facility: CLINIC | Age: 58
End: 2018-08-14

## 2018-08-14 DIAGNOSIS — M54.16 LUMBAR RADICULOPATHY: Primary | ICD-10-CM

## 2018-08-16 ENCOUNTER — OFFICE VISIT (OUTPATIENT)
Dept: PHYSICAL THERAPY | Facility: CLINIC | Age: 58
End: 2018-08-16
Payer: COMMERCIAL

## 2018-08-16 DIAGNOSIS — M54.16 LUMBAR RADICULOPATHY: Primary | ICD-10-CM

## 2018-08-16 PROCEDURE — 97010 HOT OR COLD PACKS THERAPY: CPT | Performed by: PHYSICAL MEDICINE & REHABILITATION

## 2018-08-16 PROCEDURE — 97112 NEUROMUSCULAR REEDUCATION: CPT | Performed by: PHYSICAL MEDICINE & REHABILITATION

## 2018-08-16 PROCEDURE — 97014 ELECTRIC STIMULATION THERAPY: CPT | Performed by: PHYSICAL MEDICINE & REHABILITATION

## 2018-08-16 PROCEDURE — 97110 THERAPEUTIC EXERCISES: CPT | Performed by: PHYSICAL MEDICINE & REHABILITATION

## 2018-08-16 PROCEDURE — 97140 MANUAL THERAPY 1/> REGIONS: CPT | Performed by: PHYSICAL MEDICINE & REHABILITATION

## 2018-08-16 NOTE — PROGRESS NOTES
Daily Note     Today's date: 2018  Patient name: Lianet Davila  : 1960  MRN: [id number]  Referring provider: Fercho Chavez DO  Dx:   Encounter Diagnosis     ICD-10-CM    1  Lumbar radiculopathy M54 16                   Subjective: Redell Ear reported "The pain isn't in my foot today, it is behind my knee "    Objective: See treatment diary below  Precautions: HTN    Daily Treatment Diary     Manual  2018     Therapist overpressure ext  10min           STM  5min 15min 15min         Manual traction     10min 10min 10min 15min     Lateral hip distraction       5min      STM       8min 8min         Exercise Diary              *prone progression  15min 20min 20min 20min        *TrA contraction  3x15 3x15 in prone 3x15 in prone 3x15 in supine 3x15 in supine       TrA leg fall out   2x10 2x10 2x10 2x10       Repeated extension in prone  3x15           Repeated extension in prone  3x10           Paloff Press   4lbs 10x2          Wall lateral bend - right shoulder away    3x10 3x12 4x15 3x15 5x15     Multifidi progression in prone - quadruped        3x10                                                                                                                                                    HEP teaching and return demonstration 10min                Modalities              Hot pack  8min 10min 10min 15min 15min  15min     E-Stim   10min 10min 15min 15min  15min                      Assessment: Tolerated treatment well  Patient would benefit from continued PT  Rednancy Ear demonstrated some centralization of symptoms between sessions which shows progress towards his goals  (centralized to his posterior knee from his foot) He was also able to begin multifidi progression in prone  Plan: Continue per plan of care

## 2018-08-20 ENCOUNTER — OFFICE VISIT (OUTPATIENT)
Dept: PHYSICAL THERAPY | Facility: CLINIC | Age: 58
End: 2018-08-20
Payer: COMMERCIAL

## 2018-08-20 DIAGNOSIS — M54.16 LUMBAR RADICULOPATHY: Primary | ICD-10-CM

## 2018-08-20 PROCEDURE — 97140 MANUAL THERAPY 1/> REGIONS: CPT | Performed by: PHYSICAL MEDICINE & REHABILITATION

## 2018-08-20 PROCEDURE — 97110 THERAPEUTIC EXERCISES: CPT | Performed by: PHYSICAL MEDICINE & REHABILITATION

## 2018-08-20 PROCEDURE — 97014 ELECTRIC STIMULATION THERAPY: CPT | Performed by: PHYSICAL MEDICINE & REHABILITATION

## 2018-08-20 PROCEDURE — 97010 HOT OR COLD PACKS THERAPY: CPT | Performed by: PHYSICAL MEDICINE & REHABILITATION

## 2018-08-20 PROCEDURE — 97112 NEUROMUSCULAR REEDUCATION: CPT | Performed by: PHYSICAL MEDICINE & REHABILITATION

## 2018-08-20 NOTE — PROGRESS NOTES
Daily Note     Today's date: 2018  Patient name: Kelechi You  : 1960  MRN: [id number]  Referring provider: Christos Cook DO  Dx:   Encounter Diagnosis     ICD-10-CM    1  Lumbar radiculopathy M54 16                   Subjective: Keith Gomez reported "I have a 9 for pain today, the weekend was a little better but the drive keeps getting me "    Objective: See treatment diary below  Precautions: HTN    Daily Treatment Diary     Manual  2018    Therapist overpressure ext  10min           STM  5min 15min 15min         Manual traction     10min 10min 10min 15min 15min    Lateral hip distraction       5min      STM       8min 8min         Exercise Diary              *prone progression  15min 20min 20min 20min    20min    *TrA contraction  3x15 3x15 in prone 3x15 in prone 3x15 in supine 3x15 in supine   3x15 in prone    TrA leg fall out   2x10 2x10 2x10 2x10   3x15    Repeated extension in prone  3x15           Repeated extension in prone  3x10           Paloff Press   4lbs 10x2          Wall lateral bend - right shoulder away    3x10 3x12 4x15 3x15 5x15     Multifidi progression in prone - quadruped        3x10     Hips off center prone progression         5x15    Hip IR stretch         4x30"    TrA with straight leg slide         1x6                                                                                                            HEP teaching and return demonstration 10min                Modalities              Hot pack  8min 10min 10min 15min 15min  15min 15min    E-Stim   10min 10min 15min 15min  15min 15min                     Assessment: Tolerated treatment well  Patient would benefit from continued PT  Keith Gomez was able to add hip IR stretching, TrA leg slides to his therapy program  He was also able to add hips off center prone progression to his HEP   At the end of the session Keith Gomez reported "I have a 4 and I feel like I can walk a little bit better now," These progressions show progress towards his goals  Skin integrity was checked pre and post modalities with no abnormal findings  Plan: Continue per plan of care

## 2018-08-23 ENCOUNTER — APPOINTMENT (OUTPATIENT)
Dept: PHYSICAL THERAPY | Facility: CLINIC | Age: 58
End: 2018-08-23
Payer: COMMERCIAL

## 2018-09-13 ENCOUNTER — OFFICE VISIT (OUTPATIENT)
Dept: SLEEP CENTER | Facility: CLINIC | Age: 58
End: 2018-09-13
Payer: COMMERCIAL

## 2018-09-13 VITALS
SYSTOLIC BLOOD PRESSURE: 128 MMHG | DIASTOLIC BLOOD PRESSURE: 78 MMHG | HEART RATE: 66 BPM | BODY MASS INDEX: 40.43 KG/M2 | HEIGHT: 74 IN | WEIGHT: 315 LBS

## 2018-09-13 DIAGNOSIS — G47.33 OBSTRUCTIVE SLEEP APNEA: Primary | ICD-10-CM

## 2018-09-13 DIAGNOSIS — E66.01 MORBID OBESITY WITH BMI OF 40.0-44.9, ADULT (HCC): ICD-10-CM

## 2018-09-13 PROCEDURE — 99213 OFFICE O/P EST LOW 20 MIN: CPT | Performed by: NURSE PRACTITIONER

## 2018-09-13 NOTE — PROGRESS NOTES
Progress Note - [facility] [address] [place] 59 y o  male   DBS:8[birth year]14/[birth year]19, MRN: [id number]  9/13/2018          Follow Up Evaluation / Problem:     Obstructive Sleep Apnea    HPI: Emely Patiño is a 59y o  year old male  He presents today for follow up of obstructive sleep apnea  He is currently using an auto titrating unit between 10 centimeters and 14 centimeters of water pressure  He was having difficulty using CPAP and subsequently stopped, due to symptoms of a very dry mouth with use  He restarted CPAP in August to maintain his [employer] license  He is here to review compliance data and obtain his report of usage for his license  Review of Systems      Genitourinary need to urinate more than twice a night   Cardiology ankle/leg swelling   Gastrointestinal none   Neurology none   Constitutional fatigue   Integumentary none   Psychiatry none   Musculoskeletal joint pain and muscle aches   Pulmonary none   ENT none   Endocrine frequent urination   Hematological none       Current Outpatient Prescriptions:     furosemide (LASIX) 40 mg tablet, Take 40 mg by mouth 2 (two) times a day, Disp: , Rfl:     lisinopril (ZESTRIL) 40 mg tablet, Take 40 mg by mouth daily  , Disp: , Rfl:     Naproxen Sodium (ALEVE PO), Take by mouth, Disp: , Rfl:     albuterol (PROVENTIL HFA,VENTOLIN HFA) 90 mcg/act inhaler, Inhale 2 puffs every 6 (six) hours as needed for wheezing , Disp: , Rfl:     benzonatate (TESSALON) 200 MG capsule, Take 200 mg by mouth 3 (three) times a day as needed for cough  , Disp: , Rfl:     gabapentin (NEURONTIN) 300 mg capsule, Take 300 mg by mouth 2 (two) times a day , Disp: , Rfl:     hydrochlorothiazide (HYDRODIURIL) 12 5 mg tablet, Take 12 5 mg by mouth daily  , Disp: , Rfl:     levofloxacin (LEVAQUIN) 500 mg tablet, Take 500 mg by mouth daily  , Disp: , Rfl:     Burna Sleepiness Scale  Sitting and reading: Would never doze  Watching TV: Slight chance of dozing  Sitting, inactive in a public place (e g  a theatre or a meeting): Would never doze  As a passenger in a car for an hour without a break: Would never doze  Lying down to rest in the afternoon when circumstances permit: Slight chance of dozing  Sitting and talking to someone: Would never doze  Sitting quietly after a lunch without alcohol: Slight chance of dozing  In a car, while stopped for a few minutes in traffic: Would never doze  Total score: 3              Vitals:    09/13/18 0800   BP: 128/78   Pulse: 66   Weight: (!) 156 kg (344 lb 9 6 oz)   Height: 6' 2" (1 88 m)       Body mass index is 44 24 kg/m²  EPWORTH SLEEPINESS SCORE  Total score: 3      Past History Since Last Sleep Center Visit:   He denies any changes to his health since his last visit  He reports that he has been using his auto Pap every night  He has noticed an improvement in his daytime sleepiness  His Ayden sleepiness score has decreased from 6 down to 3  Settings were changed to increase humidity and decrease heat  He also began using a chin strap with his full face mask, which has helped with oral dryness  He reports that he cleans his equipment appropriately and changes his supplies regularly  The review of systems and following portions of the patient's history were reviewed and updated as appropriate: allergies, current medications, past family history, past medical history, past social history, past surgical history, and problem list         OBJECTIVE    PAP Pressure: Full face mask used with AutoPAP between 10cm and 14cm of water pressure  DME Provider:  [facility]    Physical Exam:     General Appearance:   Alert, cooperative, no distress, appears stated age morbidly obese     Head:   Normocephalic, without obvious abnormality, atraumatic     Eyes:   PERRL, conjunctiva/corneas clear, EOM's intact          Nose:  Nares normal, septum midline, no drainage or sinus tenderness           Throat: Lips, teeth and gums normal; tongue normal size and  shape and midline mucosa  Moist redundant bilaterally, uvula  Thick at the base, tonsils  Not visualized, Mallampati class 4       Neck:  Supple, symmetrical, trachea midline, no adenopathy; Thyroid: No enlargement, tenderness or nodules; no carotid bruit or JVD     Lungs:      Clear to auscultation bilaterally, respirations unlabored     Heart:   Regular rate and rhythm, S1 and S2 normal, no murmur, rub or gallop       Extremities:  Extremities normal, atraumatic, no cyanosis or edema     Pulses:  2+ and symmetric all extremities     Skin:  Skin color, texture, turgor normal, no rashes or lesions       Neurologic:  CNII-XII intact  Normal strength, sensation throughout       ASSESSMENT / PLAN    1  Obstructive sleep apnea  PAP DME Resupply/Reorder   2  Morbid obesity with BMI of 40 0-44 9, adult [facility])             Counseling / Coordination of Care  Total clinic time spent today  15 minutes  Greater than 50% of total time was spent with the patient and / or family counseling and / or coordination of care  A description of the counseling / coordination of care:     Impressions, Diagnostic results, Prognosis, Instructions for management, Risks and benefits of treatment, Patient and family education, Risk factor reductions and Importance of compliance with treatment    Today I reviewed the patient's compliance data  he has been able to use the equipment  100% of all days recorded  Average usage was 4 or more hours  100% of all days recorded  The estimated AHI is  2 0 abnormal breathing events per hour  Response to treatment has been good  Supplies have been ordered for the next year  A compliance report has been printed and given to him today  He will continue using this equipment at the settings noted above for the next 12 months  At that timehe will then return for a routine follow-up evaluation   I have asked him to contact the [facility] if he encounters any difficulties prior to that time  The following instructions have been given to the patient today:    Patient Instructions   1  Continue use of CPAP equipment nightly  2  Continue to clean your equipment, as discussed  3  Contact the Sleep [address] [place] [address] with any questions or concerns prior to your next visit, as needed  4    Schedule visit for follow-up in 1 year        Christie Cratbree, [facility]

## 2018-09-13 NOTE — PATIENT INSTRUCTIONS
1   Continue use of CPAP equipment nightly  2  Continue to clean your equipment, as discussed  3  Contact the [facility] with any questions or concerns prior to your next visit, as needed  4    Schedule visit for follow-up in 1 year

## 2018-09-17 NOTE — PROGRESS NOTES
PT Discharge    Today's date: 2018  Patient name: Bernie Sylvester  :   MRN: [id number]  Referring provider: Rajni Weinstein DO  Dx:   Encounter Diagnosis     ICD-10-CM    1  Lumbar radiculopathy M54 16      Pt denies therapy at this time      Start Time: 1126  Stop Time:   Total time in clinic (min): 62 minutes    Assessment/Plan    Subjective    Objective

## 2018-12-07 ENCOUNTER — TRANSCRIBE ORDERS (OUTPATIENT)
Dept: ADMINISTRATIVE | Facility: HOSPITAL | Age: 58
End: 2018-12-07

## 2018-12-07 DIAGNOSIS — R60.9 EDEMA, UNSPECIFIED TYPE: Primary | ICD-10-CM

## 2019-03-15 NOTE — PROGRESS NOTES
Daily Note     Today's date: 2018  Patient name: Adrian Foster  : 1960  MRN: [id number]  Referring provider: Scott Temple DO  Dx:   Encounter Diagnosis     ICD-10-CM    1  Lumbar radiculopathy M54 16                   Subjective: Nancy Gipson reported "At two o'clock every day I am still getting the pain and I can barely walk "    Objective: See treatment diary below  Precautions: HTN    Daily Treatment Diary     Manual  2018       Therapist overpressure ext  10min           STM  5min 15min 15min         Manual traction     10min 10min                                     Exercise Diary              *prone progression  15min 20min 20min 20min        *TrA contraction  3x15 3x15 in prone 3x15 in prone 3x15 in supine 3x15 in supine       TrA leg fall out   2x10 2x10 2x10 2x10       Repeated extension in prone  3x15           Repeated extension in prone  3x10           Paloff Press   4lbs 10x2          Wall lateral bend - right shoulder away    3x10 3x12 4x15                                                                                                                                                                   HEP teaching and return demonstration 10min                Modalities              Hot pack  8min 10min 10min 15min 15min       E-Stim   10min 10min 15min 15min                      Assessment: Tolerated treatment well  Patient would benefit from continued PT  Nancy Gipson was able to improve his gait speed and normalization of gait following his wall lateral bend with extension  The extension portion was added during today's session  He reported significant relief following the movement which also shows progress towards his goals  Skin integrity was checked pre and post modalities with no abnormal findings  Plan: Continue per plan of care  130

## 2019-09-07 ENCOUNTER — OFFICE VISIT (OUTPATIENT)
Dept: SLEEP CENTER | Facility: CLINIC | Age: 59
End: 2019-09-07
Payer: COMMERCIAL

## 2019-09-07 VITALS
HEART RATE: 69 BPM | SYSTOLIC BLOOD PRESSURE: 121 MMHG | HEIGHT: 74 IN | DIASTOLIC BLOOD PRESSURE: 76 MMHG | BODY MASS INDEX: 40.43 KG/M2 | WEIGHT: 315 LBS

## 2019-09-07 DIAGNOSIS — E66.01 MORBID OBESITY WITH BMI OF 40.0-44.9, ADULT (HCC): ICD-10-CM

## 2019-09-07 DIAGNOSIS — G47.33 OBSTRUCTIVE SLEEP APNEA: Primary | ICD-10-CM

## 2019-09-07 DIAGNOSIS — I10 ESSENTIAL HYPERTENSION: ICD-10-CM

## 2019-09-07 DIAGNOSIS — R68.2 DRY MOUTH: ICD-10-CM

## 2019-09-07 PROCEDURE — 3078F DIAST BP <80 MM HG: CPT | Performed by: INTERNAL MEDICINE

## 2019-09-07 PROCEDURE — 99214 OFFICE O/P EST MOD 30 MIN: CPT | Performed by: INTERNAL MEDICINE

## 2019-09-07 PROCEDURE — 3074F SYST BP LT 130 MM HG: CPT | Performed by: INTERNAL MEDICINE

## 2019-09-07 NOTE — PROGRESS NOTES
Review of Systems      Genitourinary need to urinate more than twice a night   Cardiology ankle/leg swelling   Gastrointestinal none   Neurology muscle weakness and numbness/tingling of an extremity   Constitutional fatigue and weight change   Integumentary none   Psychiatry none   Musculoskeletal joint pain, muscle aches, back pain and leg cramps   Pulmonary snoring   ENT none   Endocrine frequent urination   Hematological none

## 2019-09-07 NOTE — PATIENT INSTRUCTIONS

## 2019-09-07 NOTE — PROGRESS NOTES
Follow-Up Note - Jose C 3  59 y o  male  IOR:3/96/6396  RUST:[id number]    CC: I saw this patient for follow-up in clinic today for his Sleep Disordered Breathing, Coexisting Sleep and Medical Problems  Results of his diagnostic study were not available  During his titration study, his sleep disordered breathing was adequately remediated with nasal CPAP at 12 cm H2O  PFSH, Problem List, Medications & Allergies were reviewed in EMR  Interval changes: none reported  He  has a past medical history of Edema and Hypertension  He has a current medication list which includes the following prescription(s): furosemide, hydrochlorothiazide, and lisinopril  ROS: Reviewed (see attached)  DATA REVIEWED:  using PAP > 4 hours/night 77% of the time  Estimated ALVAREZ 2 2/hour at pressure of 12 8 cm H2O @90th percentile  He was using inconsistently prior to the last 30 days because of dry mouth  SUBJECTIVE: Regarding use of PAP, Cielo Lees reports:   · He is experiencing significant adverse effects: dry mouth that limits use  · He is unsure if benefiting from use: no longer snoring / having breathing difficulties   Sleep Routine: He reports getting 7 hrs sleep out of approximately 7-1/2 hours in bed; he has no difficulty initiating, but reports diffculty maintaining sleep   He has interruptions of sleep 2-3 times a night because of nocturia  He awakens spontaneously and is not always refreshed  He denied excessive drowsiness   He rated himself at Total score: 7 /24 on the [place] sleepiness scale  Habits: reports that he has never smoked  He has never used smokeless tobacco ,  reports that he does not drink alcohol ,  reports that he does not use drugs  , Caffeine use: limited , Exercise routine: none   OBJECTIVE: /76   Pulse 69   Ht 6' 2" (1 88 m)   Wt (!) 160 kg (352 lb)   BMI 45 19 kg/m²    Constitutional: Patient is well groomed; well appearing    Skin/Extrem: warm & dry; col & hydration normal; no edema  Psych: cooperativeand in no distress  Mental State appears normal   CNS: Alert, orientated, clear & coherent speech  H&N: EOMI; NC/AT:no facial pressure marks, no rashes  ENMT Mucus membranes normal Nasal airway:patent  Oral airway: crowded  Resp:effort is normal CVS: RRR ABD:truncal obesity MSK:Gait normal     ASSESSMENT: Primary Sleep/Secondary(to Medical or Psych conditions) & comorbidities   1  Obstructive sleep apnea  PAP DME Resupply/Reorder   2  Dry mouth     3  Essential hypertension     4  Morbid obesity with BMI of 40 0-44 9, adult (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 and on safe driving practices  3  Care of equipment, methods to improve comfort using PAP and importance of compliance with therapy were discussed  4  Pressure setting: continue 10-14 cmH2O     5  Rx provided to replace supplies and Care coordinated with DME provider  6  Strategies for weight reduction were discussed  7  Strategies to improve dry mouth were discussed  Specifically, adequate treatment of nasal allergies, using Biotene mouthwash &/or Xylimelt  8  Follow-up is advised in 1 year or sooner if needed to monitor progress, compliance and to adjust therapy  Thank you for allowing me to participate in the care of this patient      Sincerely,    Authenticated electronically by Destiny Espino MD on 87/58/86   Board Certified Specialist

## 2019-09-10 ENCOUNTER — TELEPHONE (OUTPATIENT)
Dept: SLEEP CENTER | Facility: CLINIC | Age: 59
End: 2019-09-10

## 2020-12-27 ENCOUNTER — HOSPITAL ENCOUNTER (EMERGENCY)
Facility: HOSPITAL | Age: 60
Discharge: HOME | End: 2020-12-27
Attending: EMERGENCY MEDICINE | Admitting: EMERGENCY MEDICINE
Payer: COMMERCIAL

## 2020-12-27 ENCOUNTER — APPOINTMENT (EMERGENCY)
Dept: ULTRASOUND IMAGING | Facility: HOSPITAL | Age: 60
End: 2020-12-27
Payer: COMMERCIAL

## 2020-12-27 ENCOUNTER — OFFICE VISIT (OUTPATIENT)
Dept: URGENT CARE | Facility: CLINIC | Age: 60
End: 2020-12-27
Payer: COMMERCIAL

## 2020-12-27 VITALS
WEIGHT: 315 LBS | RESPIRATION RATE: 20 BRPM | SYSTOLIC BLOOD PRESSURE: 182 MMHG | OXYGEN SATURATION: 94 % | HEART RATE: 91 BPM | HEIGHT: 73 IN | DIASTOLIC BLOOD PRESSURE: 93 MMHG | TEMPERATURE: 97.4 F | BODY MASS INDEX: 41.75 KG/M2

## 2020-12-27 VITALS
OXYGEN SATURATION: 96 % | HEART RATE: 101 BPM | DIASTOLIC BLOOD PRESSURE: 87 MMHG | TEMPERATURE: 98.2 F | HEIGHT: 74 IN | WEIGHT: 315 LBS | SYSTOLIC BLOOD PRESSURE: 160 MMHG | RESPIRATION RATE: 20 BRPM | BODY MASS INDEX: 40.43 KG/M2

## 2020-12-27 DIAGNOSIS — L03.115 CELLULITIS OF RIGHT LOWER LEG: Primary | ICD-10-CM

## 2020-12-27 DIAGNOSIS — M79.89 LEG SWELLING: Primary | ICD-10-CM

## 2020-12-27 LAB
ALBUMIN SERPL BCP-MCNC: 3.7 G/DL (ref 3.5–5)
ALP SERPL-CCNC: 90 U/L (ref 46–116)
ALT SERPL W P-5'-P-CCNC: 50 U/L (ref 12–78)
ANION GAP SERPL CALCULATED.3IONS-SCNC: 6 MMOL/L (ref 4–13)
AST SERPL W P-5'-P-CCNC: 29 U/L (ref 5–45)
BASOPHILS # BLD AUTO: 0.05 THOUSANDS/ΜL (ref 0–0.1)
BASOPHILS NFR BLD AUTO: 1 % (ref 0–1)
BILIRUB SERPL-MCNC: 0.22 MG/DL (ref 0.2–1)
BUN SERPL-MCNC: 22 MG/DL (ref 5–25)
CALCIUM SERPL-MCNC: 9 MG/DL (ref 8.3–10.1)
CHLORIDE SERPL-SCNC: 104 MMOL/L (ref 100–108)
CK SERPL-CCNC: 139 U/L (ref 39–308)
CO2 SERPL-SCNC: 30 MMOL/L (ref 21–32)
CREAT SERPL-MCNC: 1.13 MG/DL (ref 0.6–1.3)
EOSINOPHIL # BLD AUTO: 0.25 THOUSAND/ΜL (ref 0–0.61)
EOSINOPHIL NFR BLD AUTO: 2 % (ref 0–6)
ERYTHROCYTE [DISTWIDTH] IN BLOOD BY AUTOMATED COUNT: 13.7 % (ref 11.6–15.1)
GFR SERPL CREATININE-BSD FRML MDRD: 69 ML/MIN/1.73SQ M
GLUCOSE SERPL-MCNC: 112 MG/DL (ref 65–140)
HCT VFR BLD AUTO: 43.5 % (ref 36.5–49.3)
HGB BLD-MCNC: 14.1 G/DL (ref 12–17)
IMM GRANULOCYTES # BLD AUTO: 0.05 THOUSAND/UL (ref 0–0.2)
IMM GRANULOCYTES NFR BLD AUTO: 1 % (ref 0–2)
LYMPHOCYTES # BLD AUTO: 2.35 THOUSANDS/ΜL (ref 0.6–4.47)
LYMPHOCYTES NFR BLD AUTO: 22 % (ref 14–44)
MAGNESIUM SERPL-MCNC: 1.9 MG/DL (ref 1.6–2.6)
MCH RBC QN AUTO: 30.6 PG (ref 26.8–34.3)
MCHC RBC AUTO-ENTMCNC: 32.4 G/DL (ref 31.4–37.4)
MCV RBC AUTO: 94 FL (ref 82–98)
MONOCYTES # BLD AUTO: 0.9 THOUSAND/ΜL (ref 0.17–1.22)
MONOCYTES NFR BLD AUTO: 9 % (ref 4–12)
NEUTROPHILS # BLD AUTO: 6.94 THOUSANDS/ΜL (ref 1.85–7.62)
NEUTS SEG NFR BLD AUTO: 65 % (ref 43–75)
NRBC BLD AUTO-RTO: 0 /100 WBCS
PLATELET # BLD AUTO: 189 THOUSANDS/UL (ref 149–390)
PMV BLD AUTO: 9.8 FL (ref 8.9–12.7)
POTASSIUM SERPL-SCNC: 4.2 MMOL/L (ref 3.5–5.3)
PROT SERPL-MCNC: 7.2 G/DL (ref 6.4–8.2)
RBC # BLD AUTO: 4.61 MILLION/UL (ref 3.88–5.62)
SODIUM SERPL-SCNC: 140 MMOL/L (ref 136–145)
WBC # BLD AUTO: 10.54 THOUSAND/UL (ref 4.31–10.16)

## 2020-12-27 PROCEDURE — 83735 ASSAY OF MAGNESIUM: CPT | Performed by: EMERGENCY MEDICINE

## 2020-12-27 PROCEDURE — 86618 LYME DISEASE ANTIBODY: CPT | Performed by: EMERGENCY MEDICINE

## 2020-12-27 PROCEDURE — 96374 THER/PROPH/DIAG INJ IV PUSH: CPT

## 2020-12-27 PROCEDURE — 93971 EXTREMITY STUDY: CPT

## 2020-12-27 PROCEDURE — 80053 COMPREHEN METABOLIC PANEL: CPT | Performed by: EMERGENCY MEDICINE

## 2020-12-27 PROCEDURE — 99284 EMERGENCY DEPT VISIT MOD MDM: CPT | Performed by: EMERGENCY MEDICINE

## 2020-12-27 PROCEDURE — 82550 ASSAY OF CK (CPK): CPT | Performed by: EMERGENCY MEDICINE

## 2020-12-27 PROCEDURE — G0382 LEV 3 HOSP TYPE B ED VISIT: HCPCS | Performed by: NURSE PRACTITIONER

## 2020-12-27 PROCEDURE — 36415 COLL VENOUS BLD VENIPUNCTURE: CPT | Performed by: EMERGENCY MEDICINE

## 2020-12-27 PROCEDURE — S9083 URGENT CARE CENTER GLOBAL: HCPCS | Performed by: NURSE PRACTITIONER

## 2020-12-27 PROCEDURE — 99283 EMERGENCY DEPT VISIT LOW MDM: CPT | Performed by: NURSE PRACTITIONER

## 2020-12-27 PROCEDURE — 99284 EMERGENCY DEPT VISIT MOD MDM: CPT

## 2020-12-27 PROCEDURE — 85025 COMPLETE CBC W/AUTO DIFF WBC: CPT | Performed by: EMERGENCY MEDICINE

## 2020-12-27 RX ORDER — CEPHALEXIN 250 MG/1
500 CAPSULE ORAL ONCE
Status: COMPLETED | OUTPATIENT
Start: 2020-12-27 | End: 2020-12-27

## 2020-12-27 RX ORDER — KETOROLAC TROMETHAMINE 30 MG/ML
15 INJECTION, SOLUTION INTRAMUSCULAR; INTRAVENOUS ONCE
Status: COMPLETED | OUTPATIENT
Start: 2020-12-27 | End: 2020-12-27

## 2020-12-27 RX ORDER — CEPHALEXIN 500 MG/1
500 CAPSULE ORAL 4 TIMES DAILY
Qty: 28 CAPSULE | Refills: 0 | Status: SHIPPED | OUTPATIENT
Start: 2020-12-27 | End: 2021-01-03

## 2020-12-27 RX ADMIN — CEPHALEXIN 500 MG: 250 CAPSULE ORAL at 19:42

## 2020-12-27 RX ADMIN — KETOROLAC TROMETHAMINE 15 MG: 30 INJECTION, SOLUTION INTRAMUSCULAR at 17:50

## 2020-12-28 PROCEDURE — 93971 EXTREMITY STUDY: CPT | Performed by: SURGERY

## 2020-12-29 LAB — B BURGDOR IGG+IGM SER-ACNC: 23

## 2021-12-04 ENCOUNTER — OFFICE VISIT (OUTPATIENT)
Dept: URGENT CARE | Facility: CLINIC | Age: 61
End: 2021-12-04
Payer: COMMERCIAL

## 2021-12-04 VITALS
HEART RATE: 101 BPM | RESPIRATION RATE: 18 BRPM | OXYGEN SATURATION: 96 % | HEIGHT: 62 IN | TEMPERATURE: 97.1 F | BODY MASS INDEX: 57.97 KG/M2 | WEIGHT: 315 LBS

## 2021-12-04 DIAGNOSIS — J06.9 URI WITH COUGH AND CONGESTION: Primary | ICD-10-CM

## 2021-12-04 PROCEDURE — S9083 URGENT CARE CENTER GLOBAL: HCPCS | Performed by: PHYSICIAN ASSISTANT

## 2021-12-04 PROCEDURE — 99283 EMERGENCY DEPT VISIT LOW MDM: CPT | Performed by: PHYSICIAN ASSISTANT

## 2021-12-04 PROCEDURE — 0241U HB NFCT DS VIR RESP RNA 4 TRGT: CPT | Performed by: PHYSICIAN ASSISTANT

## 2021-12-04 PROCEDURE — G0382 LEV 3 HOSP TYPE B ED VISIT: HCPCS | Performed by: PHYSICIAN ASSISTANT

## 2021-12-04 RX ORDER — TADALAFIL 5 MG/1
5 TABLET ORAL DAILY
COMMUNITY
Start: 2021-12-01

## 2021-12-04 RX ORDER — FLUTICASONE PROPIONATE 50 MCG
1 SPRAY, SUSPENSION (ML) NASAL DAILY
Qty: 9.9 ML | Refills: 0 | Status: SHIPPED | OUTPATIENT
Start: 2021-12-04

## 2021-12-04 RX ORDER — BENZONATATE 200 MG/1
200 CAPSULE ORAL 3 TIMES DAILY PRN
Qty: 20 CAPSULE | Refills: 0 | Status: SHIPPED | OUTPATIENT
Start: 2021-12-04

## 2021-12-13 ENCOUNTER — HOSPITAL ENCOUNTER (EMERGENCY)
Facility: HOSPITAL | Age: 61
Discharge: HOME | End: 2021-12-13
Attending: INTERNAL MEDICINE | Admitting: EMERGENCY MEDICINE
Payer: COMMERCIAL

## 2021-12-13 ENCOUNTER — APPOINTMENT (EMERGENCY)
Dept: RADIOLOGY | Facility: HOSPITAL | Age: 61
End: 2021-12-13
Payer: COMMERCIAL

## 2021-12-13 VITALS
HEART RATE: 68 BPM | DIASTOLIC BLOOD PRESSURE: 85 MMHG | HEIGHT: 74 IN | TEMPERATURE: 98 F | OXYGEN SATURATION: 100 % | SYSTOLIC BLOOD PRESSURE: 156 MMHG | RESPIRATION RATE: 18 BRPM | BODY MASS INDEX: 40.43 KG/M2 | WEIGHT: 315 LBS

## 2021-12-13 DIAGNOSIS — R05.9 COUGH: Primary | ICD-10-CM

## 2021-12-13 DIAGNOSIS — U07.1 COVID-19: ICD-10-CM

## 2021-12-13 PROCEDURE — 99284 EMERGENCY DEPT VISIT MOD MDM: CPT

## 2021-12-13 PROCEDURE — 99284 EMERGENCY DEPT VISIT MOD MDM: CPT | Performed by: PHYSICIAN ASSISTANT

## 2021-12-13 PROCEDURE — 71045 X-RAY EXAM CHEST 1 VIEW: CPT

## 2022-11-16 ENCOUNTER — APPOINTMENT (OUTPATIENT)
Dept: RADIOLOGY | Facility: CLINIC | Age: 62
End: 2022-11-16

## 2022-11-16 ENCOUNTER — OFFICE VISIT (OUTPATIENT)
Dept: URGENT CARE | Facility: CLINIC | Age: 62
End: 2022-11-16

## 2022-11-16 VITALS
SYSTOLIC BLOOD PRESSURE: 147 MMHG | TEMPERATURE: 98.6 F | DIASTOLIC BLOOD PRESSURE: 74 MMHG | RESPIRATION RATE: 18 BRPM | OXYGEN SATURATION: 97 % | HEART RATE: 61 BPM

## 2022-11-16 DIAGNOSIS — R05.1 ACUTE COUGH: ICD-10-CM

## 2022-11-16 DIAGNOSIS — R05.1 ACUTE COUGH: Primary | ICD-10-CM

## 2022-11-16 RX ORDER — PREDNISONE 20 MG/1
20 TABLET ORAL DAILY
Qty: 5 TABLET | Refills: 0 | Status: SHIPPED | OUTPATIENT
Start: 2022-11-16 | End: 2022-11-21

## 2022-11-16 RX ORDER — ALBUTEROL SULFATE 90 UG/1
2 AEROSOL, METERED RESPIRATORY (INHALATION) EVERY 4 HOURS PRN
Qty: 8.5 G | Refills: 0 | Status: SHIPPED | OUTPATIENT
Start: 2022-11-16

## 2022-11-17 NOTE — PATIENT INSTRUCTIONS
Chest xray preliminary negative for acute illness  Please review results on [facility] My Chart and contact PCP for any positive result  Hydrate adequately  Begin oral steroid therapy as prescribed  May take over the counter Sudafed or other sinus decongestant for nasal stuffiness  May administer albuterol rescue inhaler as needed as directed  Monitor for any shortness of breath, difficulty breathing and follow up in ER as needed  Follow up with PCP for any persistent or worsening symptoms

## 2022-11-17 NOTE — PROGRESS NOTES
[facility]        NAME: Fany Salazar is a 57 y o  male  : 1960    MRN: [id number]  DATE: 2022  TIME: 8:29 PM    Assessment and Plan   Acute cough [R05 1]  1  Acute cough  XR chest pa & lateral    predniSONE 20 mg tablet    albuterol (ProAir HFA) 90 mcg/act inhaler            Patient Instructions       Follow up with PCP in 3-5 days  Proceed to  ER if symptoms worsen  Patient Instructions   Chest xray preliminary negative for acute illness  Please review results on [facility] My Chart and contact PCP for any positive result  Hydrate adequately  Begin oral steroid therapy as prescribed  May take over the counter Sudafed or other sinus decongestant for nasal stuffiness  May administer albuterol rescue inhaler as needed as directed  Monitor for any shortness of breath, difficulty breathing and follow up in ER as needed  Follow up with PCP for any persistent or worsening symptoms  Chief Complaint     Chief Complaint   Patient presents with   • Cough     Pt reports deep cough x 3 weeks, states productive at times  Taking mucinex, and cough drops  Negative covid test         History of Present Illness       Pt here today after 3 weeks cough now productive  Afebrile  Mild clear runny nose today  At home CoVid swab negative today  No vomiting or diarrhea  Has tried Mucinex and cough drops with no symptom relief  Review of Systems   Review of Systems   Constitutional: Negative for fever  HENT: Positive for postnasal drip and rhinorrhea  Negative for congestion, ear pain, sneezing and sore throat  Eyes: Negative for discharge and redness  Respiratory: Positive for cough  Negative for chest tightness and shortness of breath  Cardiovascular: Negative for chest pain  Gastrointestinal: Negative for abdominal pain, diarrhea, nausea and vomiting  Genitourinary: Negative for decreased urine volume  Musculoskeletal: Negative for myalgias  Allergic/Immunologic: Negative for environmental allergies  Neurological: Negative for dizziness, syncope and headaches  Hematological: Negative for adenopathy  Psychiatric/Behavioral: Negative for sleep disturbance  Current Medications       Current Outpatient Medications:   •  albuterol (ProAir HFA) 90 mcg/act inhaler, Inhale 2 puffs every 4 (four) hours as needed for wheezing (cough), Disp: 8 5 g, Rfl: 0  •  predniSONE 20 mg tablet, Take 1 tablet (20 mg total) by mouth daily for 5 days With food, Disp: 5 tablet, Rfl: 0  •  benzonatate (TESSALON) 200 MG capsule, Take 1 capsule (200 mg total) by mouth 3 (three) times a day as needed for cough, Disp: 20 capsule, Rfl: 0  •  fluticasone (FLONASE) 50 mcg/act nasal spray, 1 spray into each nostril daily, Disp: 9 9 mL, Rfl: 0  •  furosemide (LASIX) 40 mg tablet, Take 40 mg by mouth 2 (two) times a day, Disp: , Rfl:   •  hydrochlorothiazide (HYDRODIURIL) 12 5 mg tablet, Take 12 5 mg by mouth daily  (Patient not taking: Reported on 12/4/2021 ), Disp: , Rfl:   •  lisinopril (ZESTRIL) 40 mg tablet, Take 40 mg by mouth daily  , Disp: , Rfl:   •  tadalafil (CIALIS) 5 MG tablet, Take 5 mg by mouth daily, Disp: , Rfl:     Current Allergies     Allergies as of 11/16/2022   • (No Known Allergies)            The following portions of the patient's history were reviewed and updated as appropriate: allergies, current medications, past family history, past medical history, past social history, past surgical history and problem list      Past Medical History:   Diagnosis Date   • Edema    • Hypertension        Past Surgical History:   Procedure Laterality Date   • APPENDECTOMY     • BACK SURGERY  03/2020    L4 L5       History reviewed  No pertinent family history  Medications have been verified  Objective   /74   Pulse 61   Temp 98 6 °F (37 °C)   Resp 18   SpO2 97%   No LMP for male patient  Physical Exam     Physical Exam  Vitals reviewed  Constitutional:       General: He is not in acute distress  Appearance: He is well-developed and well-nourished  He is not ill-appearing  HENT:      Head: Normocephalic  Right Ear: Tympanic membrane and ear canal normal       Left Ear: Tympanic membrane and ear canal normal       Nose: Nose normal       Mouth/Throat:      Lips: Pink  Mouth: Oropharynx is clear and moist and mucous membranes are normal  Mucous membranes are moist       Pharynx: Oropharynx is clear  Eyes:      General: Lids are normal          Right eye: No discharge  Left eye: No discharge  Cardiovascular:      Rate and Rhythm: Regular rhythm  Heart sounds: Normal heart sounds, S1 normal and S2 normal    Pulmonary:      Effort: Pulmonary effort is normal       Breath sounds: Normal breath sounds and air entry  No wheezing or rhonchi  Lymphadenopathy:      Cervical: No cervical adenopathy  Skin:     General: Skin is warm and dry  Neurological:      Mental Status: He is alert  Psychiatric:         Mood and Affect: Mood and affect normal          Behavior: Behavior normal  Behavior is cooperative

## 2024-07-01 ENCOUNTER — TELEPHONE (OUTPATIENT)
Age: 64
End: 2024-07-01

## 2024-07-01 NOTE — TELEPHONE ENCOUNTER
Ambar from [facility] called requesting patient's sleep study results to be faxed to [phone].  Please assist\

## 2024-07-05 NOTE — TELEPHONE ENCOUNTER
Ambar from [facility] called back in, she received prior fax but didn't receive the entire diagnostic part of the sleep study,     Please send again  Thank you   [phone]

## 2025-02-02 ENCOUNTER — OFFICE VISIT (OUTPATIENT)
Dept: URGENT CARE | Facility: MEDICAL CENTER | Age: 65
End: 2025-02-02
Payer: MEDICARE

## 2025-02-02 ENCOUNTER — APPOINTMENT (OUTPATIENT)
Dept: RADIOLOGY | Facility: MEDICAL CENTER | Age: 65
End: 2025-02-02
Payer: MEDICARE

## 2025-02-02 VITALS
TEMPERATURE: 98 F | RESPIRATION RATE: 18 BRPM | HEART RATE: 80 BPM | OXYGEN SATURATION: 96 % | DIASTOLIC BLOOD PRESSURE: 66 MMHG | SYSTOLIC BLOOD PRESSURE: 131 MMHG

## 2025-02-02 DIAGNOSIS — M79.642 PAIN OF LEFT HAND: ICD-10-CM

## 2025-02-02 DIAGNOSIS — M79.642 PAIN OF LEFT HAND: Primary | ICD-10-CM

## 2025-02-02 DIAGNOSIS — M10.9 ACUTE GOUT OF LEFT HAND, UNSPECIFIED CAUSE: ICD-10-CM

## 2025-02-02 PROCEDURE — 99214 OFFICE O/P EST MOD 30 MIN: CPT | Performed by: PHYSICIAN ASSISTANT

## 2025-02-02 PROCEDURE — G0463 HOSPITAL OUTPT CLINIC VISIT: HCPCS | Performed by: PHYSICIAN ASSISTANT

## 2025-02-02 PROCEDURE — 73130 X-RAY EXAM OF HAND: CPT

## 2025-02-02 RX ORDER — SEMAGLUTIDE 1.7 MG/.75ML
INJECTION, SOLUTION SUBCUTANEOUS
COMMUNITY
Start: 2024-11-27

## 2025-02-02 RX ORDER — PREDNISONE 5 MG/1
TABLET ORAL
COMMUNITY
Start: 2025-01-23

## 2025-02-02 RX ORDER — ACETAMINOPHEN 325 MG/1
650 TABLET ORAL ONCE
Status: COMPLETED | OUTPATIENT
Start: 2025-02-02 | End: 2025-02-02

## 2025-02-02 RX ORDER — METOPROLOL SUCCINATE 50 MG/1
50 TABLET, EXTENDED RELEASE ORAL DAILY
COMMUNITY
Start: 2024-07-01

## 2025-02-02 RX ORDER — TAMSULOSIN HYDROCHLORIDE 0.4 MG/1
0.4 CAPSULE ORAL
COMMUNITY
Start: 2024-09-26 | End: 2025-09-26

## 2025-02-02 RX ORDER — SEMAGLUTIDE 1 MG/.5ML
INJECTION, SOLUTION SUBCUTANEOUS
COMMUNITY
Start: 2024-10-28

## 2025-02-02 RX ORDER — PREDNISONE 10 MG/1
TABLET ORAL
Qty: 30 TABLET | Refills: 0 | Status: SHIPPED | OUTPATIENT
Start: 2025-02-02 | End: 2025-02-13

## 2025-02-02 RX ORDER — GABAPENTIN 300 MG/1
300 CAPSULE ORAL DAILY
COMMUNITY
Start: 2024-07-01 | End: 2026-01-15

## 2025-02-02 RX ORDER — LOSARTAN POTASSIUM 100 MG/1
100 TABLET ORAL DAILY
COMMUNITY
Start: 2024-03-18 | End: 2025-09-26

## 2025-02-02 RX ORDER — ROSUVASTATIN CALCIUM 10 MG/1
10 TABLET, COATED ORAL DAILY
COMMUNITY
Start: 2024-03-18 | End: 2025-03-18

## 2025-02-02 RX ADMIN — ACETAMINOPHEN 650 MG: 325 TABLET ORAL at 14:06

## 2025-02-02 NOTE — PROGRESS NOTES
[facility]        NAME: Butch Dawson is a 64 y.o. male  : 1960    MRN: [id number]  DATE: 2025  TIME: 2:07 PM      Assessment and Plan     Pain of left hand [M79.642]  1. Pain of left hand  XR hand 3+ vw left    acetaminophen (TYLENOL) tablet 650 mg      2. Acute gout of left hand, unspecified cause  predniSONE 10 mg tablet        Due to     POC Testing Results        Note:       Patient Instructions     Patient Instructions    Please ice for 20 minutes at least four times daily and keep injury elevated as much as possible until symptoms improve.   Please perform gentle range of motion four times daily  If symptoms do not improve please follow up with orthopedist       Follow up with primary care provider.   Go to ER if symptoms worsen.    Chief Complaint     Chief Complaint   Patient presents with    Fall    Hand Swelling     Patient states he fell approximately one week ago and now has left sided hand swelling, pain, tenderness          History of Present Illness     Pt reports 1 week ago slipping on ice and catching his fall with his left hand. He states he did not have pain after the fall. He reports in the past 3 days he has developed pain over the dorsal left second MCP joint and has difficulty moving his pointer finger. He denies prior injury to this hand and denies numbness/tingling. He also denies hitting his head or LOC with the fall. He reports history of gout. Pt reports on Friday he completed tapered course of prednisone due to surgery on the other hand.    Fall        Review of Systems     Review of Systems   Musculoskeletal:  Positive for arthralgias and joint swelling.   Skin:  Negative for rash and wound.   Neurological:  Negative for syncope.         Current Medications       Current Outpatient Medications:     gabapentin (NEURONTIN) 300 mg capsule, Take 300 mg by mouth daily, Disp: , Rfl:     losartan (COZAAR) 100 MG tablet, Take 100 mg by mouth daily, Disp: , Rfl:      metFORMIN (GLUCOPHAGE) 500 mg tablet, Take 1 tablet by mouth 2 (two) times a day with meals, Disp: , Rfl:     metoprolol succinate (TOPROL-XL) 50 mg 24 hr tablet, Take 50 mg by mouth daily, Disp: , Rfl:     predniSONE 10 mg tablet, Take 4 tablets (40 mg total) by mouth daily for 3 days, THEN 3 tablets (30 mg total) daily for 3 days, THEN 2 tablets (20 mg total) daily for 3 days, THEN 1 tablet (10 mg total) daily for 3 days., Disp: 30 tablet, Rfl: 0    predniSONE 5 mg tablet, Take 10 pills on days 1 and 2, then decrease by one pill each day until complete. Do not take other NSAIDs ( Aleve, Advil, etc) while on this medication, Disp: , Rfl:     rosuvastatin (CRESTOR) 10 MG tablet, Take 10 mg by mouth daily, Disp: , Rfl:     tamsulosin (FLOMAX) 0.4 mg, Take 0.4 mg by mouth, Disp: , Rfl:     Wegovy 1 MG/0.5ML, , Disp: , Rfl:     Wegovy 1.7 MG/0.75ML, , Disp: , Rfl:     albuterol (ProAir HFA) 90 mcg/act inhaler, Inhale 2 puffs every 4 (four) hours as needed for wheezing (cough), Disp: 8.5 g, Rfl: 0    benzonatate (TESSALON) 200 MG capsule, Take 1 capsule (200 mg total) by mouth 3 (three) times a day as needed for cough, Disp: 20 capsule, Rfl: 0    fluticasone (FLONASE) 50 mcg/act nasal spray, 1 spray into each nostril daily, Disp: 9.9 mL, Rfl: 0    furosemide (LASIX) 40 mg tablet, Take 40 mg by mouth 2 (two) times a day, Disp: , Rfl:     hydrochlorothiazide (HYDRODIURIL) 12.5 mg tablet, Take 12.5 mg by mouth daily. (Patient not taking: Reported on 12/4/2021 ), Disp: , Rfl:     lisinopril (ZESTRIL) 40 mg tablet, Take 40 mg by mouth daily., Disp: , Rfl:     MAGNESIUM PO, Take by mouth daily, Disp: , Rfl:     tadalafil (CIALIS) 5 MG tablet, Take 5 mg by mouth daily, Disp: , Rfl:   No current facility-administered medications for this visit.    Current Allergies     Allergies as of 02/02/2025 - Reviewed 02/02/2025   Allergen Reaction Noted    Lisinopril Cough 04/13/2023    Losartan Other (See Comments) 04/13/2023               The following portions of the patient's history were reviewed and updated as appropriate: allergies, current medications, past family history, past medical history, past social history, past surgical history, and problem list.     Past Medical History:   Diagnosis Date    Edema     Enlarged prostate     Hypertension        Past Surgical History:   Procedure Laterality Date    APPENDECTOMY      BACK SURGERY  03/2020    L4 L5       History reviewed. No pertinent family history.      Medications have been verified.        Objective     /66   Pulse 80   Temp 98 °F (36.7 °C) (Temporal)   Resp 18   SpO2 96%   No LMP for male patient.         Physical Exam     Physical Exam  Vitals and nursing note reviewed.   Constitutional:       Appearance: Normal appearance.   HENT:      Head: Normocephalic and atraumatic.   Eyes:      Conjunctiva/sclera: Conjunctivae normal.   Cardiovascular:      Rate and Rhythm: Normal rate and regular rhythm.      Pulses: Normal pulses.   Pulmonary:      Effort: Pulmonary effort is normal.   Musculoskeletal:      Left hand: Swelling, tenderness and bony tenderness present. No lacerations. Decreased range of motion. Decreased strength. Normal sensation. Normal capillary refill. Normal pulse.      Comments: Pt is tender over second MCP joint of left hand with notable swelling and dull erythema, pt has about 10 degrees of flexion/extension at the MCP joint of second finger, is not able to fully extend    Skin:     General: Skin is warm and dry.      Capillary Refill: Capillary refill takes less than 2 seconds.   Neurological:      General: No focal deficit present.      Mental Status: He is alert and oriented to person, place, and time. Mental status is at baseline.   Psychiatric:         Mood and Affect: Mood normal.         Behavior: Behavior normal.         Thought Content: Thought content normal.         Judgment: Judgment normal.

## 2025-02-02 NOTE — PATIENT INSTRUCTIONS
Please ice for 20 minutes at least four times daily and keep injury elevated as much as possible until symptoms improve.   Please perform gentle range of motion four times daily  If symptoms do not improve please follow up with orthopedist

## 2025-02-22 ENCOUNTER — OFFICE VISIT (OUTPATIENT)
Dept: URGENT CARE | Facility: MEDICAL CENTER | Age: 65
End: 2025-02-22
Payer: COMMERCIAL

## 2025-02-22 VITALS
RESPIRATION RATE: 20 BRPM | OXYGEN SATURATION: 98 % | TEMPERATURE: 98.9 F | DIASTOLIC BLOOD PRESSURE: 66 MMHG | HEART RATE: 80 BPM | SYSTOLIC BLOOD PRESSURE: 115 MMHG

## 2025-02-22 DIAGNOSIS — J06.9 URI WITH COUGH AND CONGESTION: ICD-10-CM

## 2025-02-22 PROCEDURE — 99213 OFFICE O/P EST LOW 20 MIN: CPT | Performed by: FAMILY MEDICINE

## 2025-02-22 RX ORDER — SENNOSIDES 8.6 MG
1300 CAPSULE ORAL EVERY 8 HOURS PRN
COMMUNITY

## 2025-02-22 RX ORDER — BROMPHENIRAMINE MALEATE, PSEUDOEPHEDRINE HYDROCHLORIDE, AND DEXTROMETHORPHAN HYDROBROMIDE 2; 30; 10 MG/5ML; MG/5ML; MG/5ML
5 SYRUP ORAL 4 TIMES DAILY PRN
Qty: 120 ML | Refills: 0 | Status: SHIPPED | OUTPATIENT
Start: 2025-02-22

## 2025-02-22 RX ORDER — CLOPIDOGREL BISULFATE 75 MG/1
75 TABLET ORAL
COMMUNITY
Start: 2025-01-14

## 2025-02-22 RX ORDER — BENZONATATE 200 MG/1
200 CAPSULE ORAL 3 TIMES DAILY PRN
Qty: 20 CAPSULE | Refills: 0 | Status: SHIPPED | OUTPATIENT
Start: 2025-02-22

## 2025-02-22 RX ORDER — AZITHROMYCIN 250 MG/1
TABLET, FILM COATED ORAL
Qty: 6 TABLET | Refills: 0 | Status: SHIPPED | OUTPATIENT
Start: 2025-02-22 | End: 2025-02-26

## 2025-02-22 NOTE — PROGRESS NOTES
[facility]        NAME: Butch Dawson is a 64 y.o. male  : 1960    MRN: [id number]  DATE: 2025  TIME: 6:36 PM    Assessment and Plan   No primary diagnosis found.  1. URI with cough and congestion  benzonatate (TESSALON) 200 MG capsule    azithromycin (ZITHROMAX) 250 mg tablet    brompheniramine-pseudoephedrine-DM 30-2-10 MG/5ML syrup        3 weeks of cough - treated with antibiotics and given cough suppresants  Discussed Return/ED parameters with patient.   Follow up with PCP if not improving.    Patient Instructions       Follow up with PCP in 3-5 days.  Proceed to  ER if symptoms worsen.    If tests have been performed at [facility], our office will contact you with results if changes need to be made to the care plan discussed with you at the visit.  [facility]    Chief Complaint     Chief Complaint   Patient presents with   • Cough     Cough ongoing for 3 weeks          History of Present Illness       Cough  This is a new problem. The current episode started 1 to 4 weeks ago. The problem has been gradually worsening. The problem occurs every few minutes. The cough is Non-productive. Associated symptoms include nasal congestion, postnasal drip, rhinorrhea, shortness of breath and wheezing. Pertinent negatives include no chest pain, chills, ear congestion, ear pain, fever, headaches, heartburn, hemoptysis, myalgias, rash, sore throat, sweats or weight loss. The symptoms are aggravated by lying down and exercise. He has tried OTC cough suppressant for the symptoms. The treatment provided mild relief. There is no history of asthma, bronchiectasis, bronchitis, COPD, emphysema, environmental allergies or pneumonia.       Review of Systems   Review of Systems   Constitutional:  Negative for chills, fever and weight loss.   HENT:  Positive for postnasal drip and rhinorrhea. Negative for ear pain and sore throat.    Respiratory:  Positive for  cough, shortness of breath and wheezing. Negative for hemoptysis.    Cardiovascular:  Negative for chest pain.   Gastrointestinal:  Negative for heartburn.   Musculoskeletal:  Negative for myalgias.   Skin:  Negative for rash.   Allergic/Immunologic: Negative for environmental allergies.   Neurological:  Negative for headaches.         Current Medications       Current Outpatient Medications:   •  azithromycin (ZITHROMAX) 250 mg tablet, Take 2 tablets today then 1 tablet daily x 4 days, Disp: 6 tablet, Rfl: 0  •  benzonatate (TESSALON) 200 MG capsule, Take 1 capsule (200 mg total) by mouth 3 (three) times a day as needed for cough, Disp: 20 capsule, Rfl: 0  •  brompheniramine-pseudoephedrine-DM 30-2-10 MG/5ML syrup, Take 5 mL by mouth 4 (four) times a day as needed for allergies, Disp: 120 mL, Rfl: 0  •  clopidogrel (PLAVIX) 75 mg tablet, Take 75 mg by mouth, Disp: , Rfl:   •  acetaminophen (TYLENOL) 650 mg CR tablet, Take 1,300 mg by mouth every 8 (eight) hours as needed, Disp: , Rfl:   •  Acetaminophen 325 MG CAPS, Take 2 capsules by mouth daily, Disp: , Rfl:   •  albuterol (ProAir HFA) 90 mcg/act inhaler, Inhale 2 puffs every 4 (four) hours as needed for wheezing (cough), Disp: 8.5 g, Rfl: 0  •  fluticasone (FLONASE) 50 mcg/act nasal spray, 1 spray into each nostril daily, Disp: 9.9 mL, Rfl: 0  •  furosemide (LASIX) 40 mg tablet, Take 40 mg by mouth 2 (two) times a day, Disp: , Rfl:   •  gabapentin (NEURONTIN) 300 mg capsule, Take 300 mg by mouth daily, Disp: , Rfl:   •  hydrochlorothiazide (HYDRODIURIL) 12.5 mg tablet, Take 12.5 mg by mouth daily. (Patient not taking: Reported on 12/4/2021 ), Disp: , Rfl:   •  lisinopril (ZESTRIL) 40 mg tablet, Take 40 mg by mouth daily., Disp: , Rfl:   •  losartan (COZAAR) 100 MG tablet, Take 100 mg by mouth daily, Disp: , Rfl:   •  MAGNESIUM PO, Take by mouth daily, Disp: , Rfl:   •  metFORMIN (GLUCOPHAGE) 500 mg tablet, Take 1 tablet by mouth 2 (two) times a day with meals,  Disp: , Rfl:   •  metoprolol succinate (TOPROL-XL) 50 mg 24 hr tablet, Take 50 mg by mouth daily, Disp: , Rfl:   •  predniSONE 5 mg tablet, Take 10 pills on days 1 and 2, then decrease by one pill each day until complete. Do not take other NSAIDs ( Aleve, Advil, etc) while on this medication, Disp: , Rfl:   •  rosuvastatin (CRESTOR) 10 MG tablet, Take 10 mg by mouth daily, Disp: , Rfl:   •  tadalafil (CIALIS) 5 MG tablet, Take 5 mg by mouth daily, Disp: , Rfl:   •  tamsulosin (FLOMAX) 0.4 mg, Take 0.4 mg by mouth, Disp: , Rfl:   •  Wegovy 1 MG/0.5ML, , Disp: , Rfl:   •  Wegovy 1.7 MG/0.75ML, , Disp: , Rfl:     Current Allergies     Allergies as of 02/22/2025 - Reviewed 02/22/2025   Allergen Reaction Noted   • Lisinopril Cough 04/13/2023   • Losartan Other (See Comments) 04/13/2023            The following portions of the patient's history were reviewed and updated as appropriate: allergies, current medications, past family history, past medical history, past social history, past surgical history and problem list.     Past Medical History:   Diagnosis Date   • Edema    • Enlarged prostate    • Hypertension        Past Surgical History:   Procedure Laterality Date   • APPENDECTOMY     • BACK SURGERY  03/2020    L4 L5       History reviewed. No pertinent family history.      Medications have been verified.        Objective   /66   Pulse 80   Temp 98.9 °F (37.2 °C) (Temporal)   Resp 20   SpO2 98%   No LMP for male patient.       Physical Exam     Physical Exam  Vitals reviewed.   Constitutional:       General: He is not in acute distress.     Appearance: Normal appearance. He is not ill-appearing, toxic-appearing or diaphoretic.   HENT:      Head: Normocephalic and atraumatic.      Right Ear: Tympanic membrane normal.      Left Ear: Tympanic membrane normal.      Nose: Congestion and rhinorrhea present.      Mouth/Throat:      Mouth: Mucous membranes are moist.      Pharynx: Posterior oropharyngeal erythema  present.      Comments: +cobblestoning  Eyes:      General:         Right eye: No discharge.         Left eye: No discharge.   Cardiovascular:      Rate and Rhythm: Normal rate and regular rhythm.      Heart sounds: No murmur heard.  Pulmonary:      Effort: Pulmonary effort is normal. No respiratory distress.      Breath sounds: Normal breath sounds. No stridor. No wheezing, rhonchi or rales.   Chest:      Chest wall: No tenderness.   Abdominal:      General: Abdomen is flat. Bowel sounds are normal.      Palpations: Abdomen is soft.   Musculoskeletal:         General: No swelling or tenderness.      Cervical back: Normal range of motion. No tenderness.   Skin:     General: Skin is warm and dry.      Capillary Refill: Capillary refill takes less than 2 seconds.   Neurological:      General: No focal deficit present.      Mental Status: He is alert and oriented to person, place, and time.   Psychiatric:         Mood and Affect: Mood normal.         Behavior: Behavior normal.         Thought Content: Thought content normal.         Judgment: Judgment normal.

## 2025-07-28 ENCOUNTER — APPOINTMENT (OUTPATIENT)
Dept: RADIOLOGY | Facility: MEDICAL CENTER | Age: 65
End: 2025-07-28
Payer: COMMERCIAL

## 2025-07-28 ENCOUNTER — OFFICE VISIT (OUTPATIENT)
Dept: URGENT CARE | Facility: MEDICAL CENTER | Age: 65
End: 2025-07-28
Payer: COMMERCIAL

## 2025-07-28 VITALS
TEMPERATURE: 98 F | HEIGHT: 73 IN | BODY MASS INDEX: 41.75 KG/M2 | HEART RATE: 70 BPM | RESPIRATION RATE: 18 BRPM | DIASTOLIC BLOOD PRESSURE: 70 MMHG | SYSTOLIC BLOOD PRESSURE: 120 MMHG | WEIGHT: 315 LBS | OXYGEN SATURATION: 98 %

## 2025-07-28 DIAGNOSIS — S89.91XA INJURY OF RIGHT LOWER EXTREMITY, INITIAL ENCOUNTER: Primary | ICD-10-CM

## 2025-07-28 DIAGNOSIS — S89.91XA INJURY OF RIGHT LOWER EXTREMITY, INITIAL ENCOUNTER: ICD-10-CM

## 2025-07-28 PROCEDURE — 73590 X-RAY EXAM OF LOWER LEG: CPT

## 2025-07-28 PROCEDURE — 99213 OFFICE O/P EST LOW 20 MIN: CPT | Performed by: PHYSICIAN ASSISTANT

## 2025-07-28 RX ORDER — CEPHALEXIN 500 MG/1
500 CAPSULE ORAL EVERY 6 HOURS SCHEDULED
Qty: 28 CAPSULE | Refills: 0 | Status: SHIPPED | OUTPATIENT
Start: 2025-07-28 | End: 2025-08-04